# Patient Record
Sex: MALE | Race: WHITE
[De-identification: names, ages, dates, MRNs, and addresses within clinical notes are randomized per-mention and may not be internally consistent; named-entity substitution may affect disease eponyms.]

---

## 2020-05-21 ENCOUNTER — HOSPITAL ENCOUNTER (EMERGENCY)
Dept: HOSPITAL 8 - ED | Age: 20
Discharge: LEFT BEFORE DISCHARGE | End: 2020-05-21
Payer: SELF-PAY

## 2020-05-21 DIAGNOSIS — Z53.21: ICD-10-CM

## 2020-05-21 DIAGNOSIS — R68.89: Primary | ICD-10-CM

## 2020-05-22 ENCOUNTER — HOSPITAL ENCOUNTER (EMERGENCY)
Dept: HOSPITAL 8 - ED | Age: 20
Discharge: PSYCHIATRIC OR SUBSTANCE USE FACILITY | End: 2020-05-22
Payer: COMMERCIAL

## 2020-05-22 VITALS — WEIGHT: 220.46 LBS | HEIGHT: 74 IN | BODY MASS INDEX: 28.29 KG/M2

## 2020-05-22 VITALS — DIASTOLIC BLOOD PRESSURE: 80 MMHG | SYSTOLIC BLOOD PRESSURE: 124 MMHG

## 2020-05-22 DIAGNOSIS — F22: ICD-10-CM

## 2020-05-22 DIAGNOSIS — Z03.818: Primary | ICD-10-CM

## 2020-05-22 LAB
ALBUMIN SERPL-MCNC: 4.8 G/DL (ref 3.4–5)
ALP SERPL-CCNC: 77 U/L (ref 45–117)
ALT SERPL-CCNC: 26 U/L (ref 12–78)
ANION GAP SERPL CALC-SCNC: 6 MMOL/L (ref 5–15)
BASOPHILS # BLD AUTO: 0.07 X10^3/UL (ref 0–0.3)
BASOPHILS NFR BLD AUTO: 1 % (ref 0–1)
BILIRUB SERPL-MCNC: 2.4 MG/DL (ref 0.2–1)
CALCIUM SERPL-MCNC: 9 MG/DL (ref 8.5–10.1)
CHLORIDE SERPL-SCNC: 105 MMOL/L (ref 98–107)
CREAT SERPL-MCNC: 1.37 MG/DL (ref 0.7–1.3)
EOSINOPHIL # BLD AUTO: 0.05 X10^3/UL (ref 0–0.8)
EOSINOPHIL NFR BLD AUTO: 1 % (ref 1–7)
ERYTHROCYTE [DISTWIDTH] IN BLOOD BY AUTOMATED COUNT: 12.9 % (ref 9.4–14.8)
LYMPHOCYTES # BLD AUTO: 1.37 X10^3/UL (ref 1–6.1)
LYMPHOCYTES NFR BLD AUTO: 17 % (ref 22–44)
MCH RBC QN AUTO: 30.7 PG (ref 27.5–34.5)
MCHC RBC AUTO-ENTMCNC: 33.6 G/DL (ref 33.2–36.2)
MCV RBC AUTO: 91.3 FL (ref 81–97)
MD: NO
MONOCYTES # BLD AUTO: 0.78 X10^3/UL (ref 0–1.4)
MONOCYTES NFR BLD AUTO: 10 % (ref 2–9)
NEUTROPHILS # BLD AUTO: 5.63 X10^3/UL (ref 1.8–8)
NEUTROPHILS NFR BLD AUTO: 71 % (ref 42–75)
PLATELET # BLD AUTO: 216 X10^3/UL (ref 130–400)
PMV BLD AUTO: 8.9 FL (ref 7.4–10.4)
PROT SERPL-MCNC: 8.2 G/DL (ref 6.4–8.2)
RBC # BLD AUTO: 5.49 X10^6/UL (ref 4.38–5.82)
VANCOMYCIN TROUGH SERPL-MCNC: < 1.7 MG/DL (ref 2.8–20)

## 2020-05-22 PROCEDURE — 80307 DRUG TEST PRSMV CHEM ANLYZR: CPT

## 2020-05-22 PROCEDURE — 84443 ASSAY THYROID STIM HORMONE: CPT

## 2020-05-22 PROCEDURE — 80053 COMPREHEN METABOLIC PANEL: CPT

## 2020-05-22 PROCEDURE — 36415 COLL VENOUS BLD VENIPUNCTURE: CPT

## 2020-05-22 PROCEDURE — 99285 EMERGENCY DEPT VISIT HI MDM: CPT

## 2020-05-22 PROCEDURE — 85025 COMPLETE CBC W/AUTO DIFF WBC: CPT

## 2020-05-22 NOTE — NUR
pt is restless and manic. Pt repeatedly wanting to come out of room, talking to 
RNs and sitter with rushed speech and nonlinear thought process. Pt called mom, 
Krissy, using ER phone. Mother updated on pt care and status at this time. Pt 
is NAD, RESP WNL, FCS no SOB noted, MAEx4, call light on bed.

## 2020-05-22 NOTE — NUR
Dr Steiner psychiatrist accepting pt for [facility]. RN to call from [facility] for nurse 
to nurse report.

## 2020-05-22 NOTE — NUR
pt reoriented to reality, given sour strings candy, NAD, RESP WNL, rushed 
speech noted no SOB, P/W/D, WCTM. Waiting to transfer to [facility]

## 2020-05-22 NOTE — NUR
pt requiring reorientation approximately every 5 minutes, trying to leave room, 
rush speech that is nonlinear with a train of thought. Pt currently back in 
room, sitting up on bed, NAD, RESP WNL, P/W/D, FCS no SOB. WCTM. waiting for 
placement bed.

## 2020-05-22 NOTE — NUR
Bedside report from William TANG, pt care transferred at this time. Pt resting in 
gurney, under blankets, NAD, RESP even and unlabored, rushed/pressured speech 
noted, pt conversations are not linear and jumps around, P/W/D, 1/1 bags 
checked in [facility].

## 2020-05-22 NOTE — NUR
URINE COLLECTED AND WALKED TO LAB.  PATIENT PUT ON HOSPITAL BED FOR COMFORT.  
PATIENT KEEPS MAKING STATEMENTS THAT HE WANTS TO GO HOME, BUT CONTINUES TO HAVE 
FLIGHT OF IDEAS.  EXPLAINED TO PATIENT THAT HE IS ON A LEGAL HOLD AND THAT HE 
IS UNABLE TO LEAVE AT THIS TIME.

## 2020-05-22 NOTE — NUR
TASK RN: ERP AT BEDSIDE FOR INITIAL ASSESSMENT. PT PACING IN ROOM, PRESSURED 
SPEECH, AND REFUSING PHYSICAL EXAM. ROOM SECURE, NO BELONGINGS NOTED IN ROOM. 
SITTER AT DOORWAY FOR CLOSE OBS. NAD NOTED.

## 2020-05-22 NOTE — NUR
pt updated on POC and transfer to [facility], NAD, RESP WNL, P/W/D. WCTM.



Report to Trent TANG at [facility]. Pt to transfer at approximately [id number]

## 2020-05-22 NOTE — NUR
pt resting in [facility] at this time, NAD, RESP WNL, lights dimmed for comfort, 
P/W/D. WCTM. waiting for [facility] bed

## 2021-09-28 ENCOUNTER — HOSPITAL ENCOUNTER (EMERGENCY)
Dept: HOSPITAL 8 - ED | Age: 21
Discharge: HOME | End: 2021-09-28
Payer: COMMERCIAL

## 2021-09-28 VITALS — HEIGHT: 75 IN | BODY MASS INDEX: 39.17 KG/M2 | WEIGHT: 315 LBS

## 2021-09-28 VITALS — DIASTOLIC BLOOD PRESSURE: 94 MMHG | SYSTOLIC BLOOD PRESSURE: 142 MMHG

## 2021-09-28 DIAGNOSIS — F17.200: ICD-10-CM

## 2021-09-28 DIAGNOSIS — F51.04: Primary | ICD-10-CM

## 2021-09-28 DIAGNOSIS — R05: ICD-10-CM

## 2021-09-28 DIAGNOSIS — F31.9: ICD-10-CM

## 2021-09-28 LAB
ALBUMIN SERPL-MCNC: 3.7 G/DL (ref 3.4–5)
ALP SERPL-CCNC: 93 U/L (ref 45–117)
ALT SERPL-CCNC: 31 U/L (ref 12–78)
ANION GAP SERPL CALC-SCNC: 3 MMOL/L (ref 5–15)
BASOPHILS # BLD AUTO: 0.1 X10^3/UL (ref 0–0.1)
BASOPHILS NFR BLD AUTO: 1 % (ref 0–1)
BILIRUB SERPL-MCNC: 1 MG/DL (ref 0.2–1)
CALCIUM SERPL-MCNC: 8.6 MG/DL (ref 8.5–10.1)
CHLORIDE SERPL-SCNC: 108 MMOL/L (ref 98–107)
CREAT SERPL-MCNC: 0.98 MG/DL (ref 0.7–1.3)
EOSINOPHIL # BLD AUTO: 0.1 X10^3/UL (ref 0–0.4)
EOSINOPHIL NFR BLD AUTO: 1 % (ref 1–7)
ERYTHROCYTE [DISTWIDTH] IN BLOOD BY AUTOMATED COUNT: 13.4 % (ref 9.4–14.8)
LYMPHOCYTES # BLD AUTO: 1.5 X10^3/UL (ref 1–3.4)
LYMPHOCYTES NFR BLD AUTO: 15 % (ref 22–44)
MCH RBC QN AUTO: 30 PG (ref 27.5–34.5)
MCHC RBC AUTO-ENTMCNC: 34.5 G/DL (ref 33.2–36.2)
MONOCYTES # BLD AUTO: 0.9 X10^3/UL (ref 0.2–0.8)
MONOCYTES NFR BLD AUTO: 8 % (ref 2–9)
NEUTROPHILS # BLD AUTO: 7.6 X10^3/UL (ref 1.8–6.8)
NEUTROPHILS NFR BLD AUTO: 75 % (ref 42–75)
PLATELET # BLD AUTO: 232 X10^3/UL (ref 130–400)
PMV BLD AUTO: 9.4 FL (ref 7.4–10.4)
PROT SERPL-MCNC: 7.2 G/DL (ref 6.4–8.2)
RBC # BLD AUTO: 5.57 X10^6/UL (ref 4.38–5.82)
T4 (THYROXINE): 6.2 MCG/DL (ref 4.5–12.1)

## 2021-09-28 PROCEDURE — 80053 COMPREHEN METABOLIC PANEL: CPT

## 2021-09-28 PROCEDURE — 71045 X-RAY EXAM CHEST 1 VIEW: CPT

## 2021-09-28 PROCEDURE — 85025 COMPLETE CBC W/AUTO DIFF WBC: CPT

## 2021-09-28 PROCEDURE — 84443 ASSAY THYROID STIM HORMONE: CPT

## 2021-09-28 PROCEDURE — 36415 COLL VENOUS BLD VENIPUNCTURE: CPT

## 2021-09-28 PROCEDURE — 84436 ASSAY OF TOTAL THYROXINE: CPT

## 2021-09-28 PROCEDURE — 80178 ASSAY OF LITHIUM: CPT

## 2021-09-28 PROCEDURE — 99284 EMERGENCY DEPT VISIT MOD MDM: CPT

## 2022-11-22 ENCOUNTER — APPOINTMENT (OUTPATIENT)
Dept: RADIOLOGY | Facility: IMAGING CENTER | Age: 22
End: 2022-11-22
Attending: NURSE PRACTITIONER

## 2022-11-22 ENCOUNTER — OFFICE VISIT (OUTPATIENT)
Dept: URGENT CARE | Facility: CLINIC | Age: 22
End: 2022-11-22

## 2022-11-22 VITALS
SYSTOLIC BLOOD PRESSURE: 128 MMHG | RESPIRATION RATE: 16 BRPM | HEART RATE: 68 BPM | DIASTOLIC BLOOD PRESSURE: 78 MMHG | OXYGEN SATURATION: 98 % | TEMPERATURE: 98 F

## 2022-11-22 DIAGNOSIS — S30.0XXA CONTUSION OF COCCYX, INITIAL ENCOUNTER: ICD-10-CM

## 2022-11-22 PROCEDURE — 99203 OFFICE O/P NEW LOW 30 MIN: CPT | Performed by: NURSE PRACTITIONER

## 2022-11-23 ASSESSMENT — ENCOUNTER SYMPTOMS
SWOLLEN GLANDS: 0
FEVER: 0
TINGLING: 0
NECK PAIN: 0
ABDOMINAL PAIN: 0
BACK PAIN: 0
CHILLS: 0
MYALGIAS: 0
SORE THROAT: 0
SHORTNESS OF BREATH: 0
PAIN: 1
NAUSEA: 0
DIZZINESS: 0
VOMITING: 0
WEAKNESS: 0
EYE REDNESS: 0
VERTIGO: 0

## 2022-11-23 NOTE — PROGRESS NOTES
Subjective:   Sergio Dave is a 22 y.o. male who presents for Pain (Tailbone contusion crashed snowboarding)      Pain  This is a new problem. The current episode started in the past 7 days (Crashed while snowboarding landing directly on tailbone having pain). Pertinent negatives include no abdominal pain, chest pain, chills, fever, myalgias, nausea, neck pain, rash, sore throat, swollen glands, vertigo, vomiting or weakness. The symptoms are aggravated by walking (Sitting). He has tried acetaminophen and NSAIDs for the symptoms. The treatment provided mild relief.     Review of Systems   Constitutional:  Negative for chills and fever.   HENT:  Negative for sore throat.    Eyes:  Negative for redness.   Respiratory:  Negative for shortness of breath.    Cardiovascular:  Negative for chest pain.   Gastrointestinal:  Negative for abdominal pain, nausea and vomiting.   Genitourinary:  Negative for dysuria.   Musculoskeletal:  Negative for back pain, myalgias and neck pain.        Tailbone pain   Skin:  Negative for rash.   Neurological:  Negative for dizziness, vertigo, tingling and weakness.     Medications:    This patient does not have an active medication from one of the medication groupers.    Allergies: Patient has no known allergies.    Problem List: Sergio Dave does not have a problem list on file.    Surgical History:  No past surgical history on file.    Past Social Hx: Sergio Dave       Past Family Hx:  Sergio Dave family history is not on file.     Problem list, medications, and allergies reviewed by myself today in Epic.     Objective:     /78   Pulse 68   Temp 36.7 °C (98 °F)   Resp 16   SpO2 98%     Physical Exam  Constitutional:       Appearance: Normal appearance. He is not ill-appearing or toxic-appearing.   HENT:      Head: Normocephalic.      Right Ear: External ear normal.      Left Ear: External ear normal.      Nose: Nose normal.      Mouth/Throat:      Lips: Pink.      Mouth: Mucous  membranes are moist.   Eyes:      General: Lids are normal.         Right eye: No discharge.         Left eye: No discharge.   Pulmonary:      Effort: Pulmonary effort is normal. No accessory muscle usage or respiratory distress.   Musculoskeletal:         General: Normal range of motion.      Cervical back: Normal and full passive range of motion without pain.      Thoracic back: Normal.      Lumbar back: Spasms and tenderness present.        Back:       Comments: Tenderness palpation to the coccyx region, gait normal.   Skin:     Coloration: Skin is not pale.   Neurological:      Mental Status: He is alert and oriented to person, place, and time.      Deep Tendon Reflexes:      Reflex Scores:       Patellar reflexes are 2+ on the right side and 2+ on the left side.  Psychiatric:         Mood and Affect: Mood normal.         Thought Content: Thought content normal.       Assessment/Plan:     Diagnosis and associated orders:     1. Contusion of coccyx, initial encounter  CANCELED: DX-SACRUM AND COCCYX 2+         Comments/MDM:       I personally reviewed prior external notes and prior test results pertinent to today's visit.   Patient having no neurological deficits,, no signs of cauda equina, patient is without insurance declined x-ray imaging on today's exam.  Discussed rest, Tylenol Motrin as needed for pain, did range of motion, resume activity as tolerated.  Discussed management options, risks and benefits, and alternatives to treatment plan agreed upon.   Red flags discussed and indications to immediately call 911 or present to the Emergency Department.   Supportive care, differential diagnoses, and indications for immediate follow-up discussed with patient.    Patient expresses understanding and agrees to plan. Patient denies any other questions or concerns.            Please note that this dictation was created using voice recognition software. I have made a reasonable attempt to correct obvious errors, but I  expect that there are errors of grammar and possibly content that I did not discover before finalizing the note.    This note was electronically signed by Ed BROWNING.

## 2024-09-23 SDOH — ECONOMIC STABILITY: INCOME INSECURITY: IN THE LAST 12 MONTHS, WAS THERE A TIME WHEN YOU WERE NOT ABLE TO PAY THE MORTGAGE OR RENT ON TIME?: YES

## 2024-09-23 SDOH — ECONOMIC STABILITY: FOOD INSECURITY: WITHIN THE PAST 12 MONTHS, YOU WORRIED THAT YOUR FOOD WOULD RUN OUT BEFORE YOU GOT MONEY TO BUY MORE.: NEVER TRUE

## 2024-09-23 SDOH — ECONOMIC STABILITY: TRANSPORTATION INSECURITY
IN THE PAST 12 MONTHS, HAS LACK OF RELIABLE TRANSPORTATION KEPT YOU FROM MEDICAL APPOINTMENTS, MEETINGS, WORK OR FROM GETTING THINGS NEEDED FOR DAILY LIVING?: NO

## 2024-09-23 SDOH — ECONOMIC STABILITY: INCOME INSECURITY: HOW HARD IS IT FOR YOU TO PAY FOR THE VERY BASICS LIKE FOOD, HOUSING, MEDICAL CARE, AND HEATING?: HARD

## 2024-09-23 SDOH — ECONOMIC STABILITY: FOOD INSECURITY: WITHIN THE PAST 12 MONTHS, THE FOOD YOU BOUGHT JUST DIDN'T LAST AND YOU DIDN'T HAVE MONEY TO GET MORE.: NEVER TRUE

## 2024-09-23 SDOH — ECONOMIC STABILITY: TRANSPORTATION INSECURITY
IN THE PAST 12 MONTHS, HAS THE LACK OF TRANSPORTATION KEPT YOU FROM MEDICAL APPOINTMENTS OR FROM GETTING MEDICATIONS?: NO

## 2024-09-23 SDOH — HEALTH STABILITY: PHYSICAL HEALTH: ON AVERAGE, HOW MANY MINUTES DO YOU ENGAGE IN EXERCISE AT THIS LEVEL?: 60 MIN

## 2024-09-23 SDOH — ECONOMIC STABILITY: TRANSPORTATION INSECURITY
IN THE PAST 12 MONTHS, HAS LACK OF TRANSPORTATION KEPT YOU FROM MEETINGS, WORK, OR FROM GETTING THINGS NEEDED FOR DAILY LIVING?: NO

## 2024-09-23 SDOH — ECONOMIC STABILITY: HOUSING INSECURITY
IN THE LAST 12 MONTHS, WAS THERE A TIME WHEN YOU DID NOT HAVE A STEADY PLACE TO SLEEP OR SLEPT IN A SHELTER (INCLUDING NOW)?: YES

## 2024-09-23 SDOH — HEALTH STABILITY: MENTAL HEALTH
STRESS IS WHEN SOMEONE FEELS TENSE, NERVOUS, ANXIOUS, OR CAN'T SLEEP AT NIGHT BECAUSE THEIR MIND IS TROUBLED. HOW STRESSED ARE YOU?: RATHER MUCH

## 2024-09-23 SDOH — HEALTH STABILITY: PHYSICAL HEALTH: ON AVERAGE, HOW MANY DAYS PER WEEK DO YOU ENGAGE IN MODERATE TO STRENUOUS EXERCISE (LIKE A BRISK WALK)?: 4 DAYS

## 2024-09-23 ASSESSMENT — SOCIAL DETERMINANTS OF HEALTH (SDOH)
HOW OFTEN DO YOU HAVE A DRINK CONTAINING ALCOHOL: 2-3 TIMES A WEEK
HOW OFTEN DO YOU GET TOGETHER WITH FRIENDS OR RELATIVES?: TWICE A WEEK
HOW OFTEN DO YOU ATTEND CHURCH OR RELIGIOUS SERVICES?: NEVER
IN THE PAST 12 MONTHS, HAS THE ELECTRIC, GAS, OIL, OR WATER COMPANY THREATENED TO SHUT OFF SERVICE IN YOUR HOME?: NO
HOW OFTEN DO YOU ATTENT MEETINGS OF THE CLUB OR ORGANIZATION YOU BELONG TO?: NEVER
HOW OFTEN DO YOU HAVE SIX OR MORE DRINKS ON ONE OCCASION: LESS THAN MONTHLY
HOW OFTEN DO YOU GET TOGETHER WITH FRIENDS OR RELATIVES?: TWICE A WEEK
DO YOU BELONG TO ANY CLUBS OR ORGANIZATIONS SUCH AS CHURCH GROUPS UNIONS, FRATERNAL OR ATHLETIC GROUPS, OR SCHOOL GROUPS?: NO
DO YOU BELONG TO ANY CLUBS OR ORGANIZATIONS SUCH AS CHURCH GROUPS UNIONS, FRATERNAL OR ATHLETIC GROUPS, OR SCHOOL GROUPS?: NO
IN A TYPICAL WEEK, HOW MANY TIMES DO YOU TALK ON THE PHONE WITH FAMILY, FRIENDS, OR NEIGHBORS?: THREE TIMES A WEEK
ARE YOU MARRIED, WIDOWED, DIVORCED, SEPARATED, NEVER MARRIED, OR LIVING WITH A PARTNER?: NEVER MARRIED
HOW OFTEN DO YOU ATTENT MEETINGS OF THE CLUB OR ORGANIZATION YOU BELONG TO?: NEVER
HOW MANY DRINKS CONTAINING ALCOHOL DO YOU HAVE ON A TYPICAL DAY WHEN YOU ARE DRINKING: 3 OR 4
HOW HARD IS IT FOR YOU TO PAY FOR THE VERY BASICS LIKE FOOD, HOUSING, MEDICAL CARE, AND HEATING?: HARD
WITHIN THE PAST 12 MONTHS, YOU WORRIED THAT YOUR FOOD WOULD RUN OUT BEFORE YOU GOT THE MONEY TO BUY MORE: NEVER TRUE
HOW OFTEN DO YOU ATTEND CHURCH OR RELIGIOUS SERVICES?: NEVER
IN A TYPICAL WEEK, HOW MANY TIMES DO YOU TALK ON THE PHONE WITH FAMILY, FRIENDS, OR NEIGHBORS?: THREE TIMES A WEEK
ARE YOU MARRIED, WIDOWED, DIVORCED, SEPARATED, NEVER MARRIED, OR LIVING WITH A PARTNER?: NEVER MARRIED

## 2024-09-23 ASSESSMENT — LIFESTYLE VARIABLES
AUDIT-C TOTAL SCORE: 5
HOW MANY STANDARD DRINKS CONTAINING ALCOHOL DO YOU HAVE ON A TYPICAL DAY: 3 OR 4
SKIP TO QUESTIONS 9-10: 0
HOW OFTEN DO YOU HAVE SIX OR MORE DRINKS ON ONE OCCASION: LESS THAN MONTHLY
HOW OFTEN DO YOU HAVE A DRINK CONTAINING ALCOHOL: 2-3 TIMES A WEEK

## 2024-09-26 ENCOUNTER — OFFICE VISIT (OUTPATIENT)
Dept: MEDICAL GROUP | Facility: CLINIC | Age: 24
End: 2024-09-26
Payer: COMMERCIAL

## 2024-09-26 VITALS
TEMPERATURE: 97.5 F | OXYGEN SATURATION: 97 % | SYSTOLIC BLOOD PRESSURE: 120 MMHG | DIASTOLIC BLOOD PRESSURE: 80 MMHG | WEIGHT: 289.6 LBS | BODY MASS INDEX: 37.17 KG/M2 | HEIGHT: 74 IN | RESPIRATION RATE: 16 BRPM | HEART RATE: 57 BPM

## 2024-09-26 DIAGNOSIS — S49.92XA INJURY OF LEFT SHOULDER, INITIAL ENCOUNTER: ICD-10-CM

## 2024-09-26 DIAGNOSIS — Z86.69 HISTORY OF HEARING LOSS: ICD-10-CM

## 2024-09-26 DIAGNOSIS — Z86.59 HISTORY OF BIPOLAR DISORDER: ICD-10-CM

## 2024-09-26 DIAGNOSIS — Z76.89 ENCOUNTER TO ESTABLISH CARE: ICD-10-CM

## 2024-09-26 RX ORDER — LAMOTRIGINE 25 MG/1
25 TABLET ORAL 2 TIMES DAILY
COMMUNITY
Start: 2024-02-19

## 2024-09-26 RX ORDER — TADALAFIL 5 MG/1
5 TABLET ORAL PRN
COMMUNITY
Start: 2023-08-01

## 2024-09-26 ASSESSMENT — PATIENT HEALTH QUESTIONNAIRE - PHQ9: CLINICAL INTERPRETATION OF PHQ2 SCORE: 0

## 2024-09-26 NOTE — PROGRESS NOTES
SUBJECTIVE:     CC:    Chief Complaint   Patient presents with    New Patient     Establish care, ear pressure for 8mos now, mental health care, left shoulder injury       HISTORY OF THE PRESENT ILLNESS:   Patient is a 24 y.o. male. This pleasant patient is here today to establish care and requesting referrals to behavioral health and ENT. Patient is originally from [place], relocated to [place] in 2017. But continue to have his healthcare through [facility]. This is his first opportunity to have his own personal pcp. Patient states he is up to date on his vaccines. Patient states he has no major concerns today.     Past Medical History:  Past Medical History:   Diagnosis Date    Anxiety     Depression      Surgical History:  No past surgical history on file.    Family History:  Family History   Problem Relation Age of Onset    Diabetes Father         recently developed either pre or full type 2    Stroke Father         epilepsy    Breast Cancer Maternal Grandmother         mastectomy needed    Dementia Paternal Grandfather         dementia and Alzheimer's    Psychiatric Illness Paternal Uncle         Bipolar 1       Social History:  Social History     Tobacco Use    Smoking status: Former     Current packs/day: 0.10     Average packs/day: 0.1 packs/day for 1 year (0.1 ttl pk-yrs)     Types: Cigarettes, Cigars    Tobacco comments:     daily user of nicotine vaporizer; desire to quit, found extremely difficult   Substance Use Topics    Alcohol use: Yes     Alcohol/week: 4.8 oz     Types: 6 Cans of beer, 2 Shots of liquor per week     Comment: variable based on the week; values represent LT average    Drug use: Not Currently     Types: Marijuana     Comment: previously utilized marijuana both smoking and edibles       Medications:  Current Outpatient Medications on File Prior to Visit   Medication Sig Dispense Refill    lamoTRIgine (LAMICTAL) 25 MG Tab Take 25 mg by mouth 2 times a day.      tadalafil (CIALIS) 5 MG  "tablet Take 5 mg by mouth as needed.       No current facility-administered medications on file prior to visit.       No Known Allergies      ROS:   Gen: no fevers/chills, no changes in weight  Eyes: no changes in vision  ENT: no changes in hearing  Pulm: no sob, no cough  CV: no chest pain, no palpitations  GI: no nausea/vomiting, no diarrhea  MSk: no myalgias  Skin: no rash  Neuro: no headaches, no numbness/tingling      OBJECTIVE:     Exam: /80 (BP Location: Left arm, Patient Position: Sitting, BP Cuff Size: Large adult)   Pulse (!) 57   Temp 36.4 °C (97.5 °F) (Temporal)   Resp 16   Ht 1.88 m (6' 2\")   Wt (!) 131 kg (289 lb 9.6 oz)   SpO2 97%  Body mass index is 37.18 kg/m².    General: Normal appearing. No distress.  HEENT: Normocephalic. Atraumatic.  Neck: Supple without JVD or bruit. Thyroid is not enlarged.  Pulmonary: Clear to ausculation.  Normal effort. No rales, ronchi, or wheezing.  Cardiovascular: Regular rate and rhythm without murmur. Carotid and radial pulses are intact and equal bilaterally.  Abdomen: Soft, nontender, nondistended. Normal bowel sounds.   Neurologic: Grossly nonfocal  Musculoskeletal: Normal gait. No extremity cyanosis, clubbing, or edema.  Psych: Normal mood and affect. Alert and oriented x3. Judgment and insight is normal.      ASSESSMENT & PLAN:   24 y.o. male with the following -      Problem List Items Addressed This Visit       Encounter to establish care      - Lifestyle and dietary modifications were emphasized including exercising and walking.   - Adequate hydration with water  - Eating healthy meals, fruits and vegetable,   - Decreasing food with high sugar content such as soda intake and sweets.  - Limiting fatty and greasy foods. Avoid foods high in fat.  - Recommend lean cuts of meats, skim milk or 1%.  - Encourage to bake, broil, boil, grill, roast or microwave foods.        History of hearing loss      Patient reports of history of ear issues in the last " 2.5 years but worsening hearing volume loss and pressure (R>L) right) in the past 8 months. He denies vertigo or dizzy symptoms. Patient is requesting referral to ENT.  - Patient will sign release of medical information for in order to get his medical records from [facility].  - Referral to ENT placed.      Relevant Orders    Referral to ENT        History of bipolar disorder      Patient reports long history of mental health. He states was on lithium and clozapine from [facility]. However, in May 2020, he was diagnosed with Bipolar 1 by [facility], where he was inpatient hold for 3 months (May 2020 - July 2020). He denies manic episodes since. He states he was on lamotrigine 25mg BID. Last use was Ivonne 10, 2024. Patient admits he is not compliant with medications.  - Patient will sign release of medical information for in order to get his medical records from [facility].  - Referral to behavioral health placed.      Relevant Orders    Referral to Behavioral Health      Injury of left shoulder, initial encounter      Patient states he has a history of left shoulder pain after a snowboard accident in 2022. He states imagings and physical therapy were ordered, but he did not follow up with PT. Patient denies any symptoms at this time.  - Patient will sign release of medical information for in order to get his medical records from [facility].  - Encourage patient to follow up with [facility] sport medicine team as needed.         Freddie Bailey, PGY-3  [facility]

## 2024-10-03 ENCOUNTER — APPOINTMENT (OUTPATIENT)
Dept: RADIOLOGY | Facility: CLINIC | Age: 24
End: 2024-10-03
Attending: STUDENT IN AN ORGANIZED HEALTH CARE EDUCATION/TRAINING PROGRAM
Payer: COMMERCIAL

## 2024-10-03 ENCOUNTER — APPOINTMENT (OUTPATIENT)
Dept: MEDICAL GROUP | Facility: CLINIC | Age: 24
End: 2024-10-03
Payer: COMMERCIAL

## 2024-10-03 VITALS
WEIGHT: 289 LBS | BODY MASS INDEX: 37.09 KG/M2 | SYSTOLIC BLOOD PRESSURE: 122 MMHG | RESPIRATION RATE: 16 BRPM | DIASTOLIC BLOOD PRESSURE: 81 MMHG | HEART RATE: 72 BPM | OXYGEN SATURATION: 98 % | HEIGHT: 74 IN

## 2024-10-03 DIAGNOSIS — M25.512 CHRONIC LEFT SHOULDER PAIN: ICD-10-CM

## 2024-10-03 DIAGNOSIS — G89.29 CHRONIC LEFT SHOULDER PAIN: ICD-10-CM

## 2024-10-03 PROCEDURE — 99213 OFFICE O/P EST LOW 20 MIN: CPT | Mod: GC | Performed by: STUDENT IN AN ORGANIZED HEALTH CARE EDUCATION/TRAINING PROGRAM

## 2024-10-03 PROCEDURE — 3079F DIAST BP 80-89 MM HG: CPT | Performed by: STUDENT IN AN ORGANIZED HEALTH CARE EDUCATION/TRAINING PROGRAM

## 2024-10-03 PROCEDURE — 73030 X-RAY EXAM OF SHOULDER: CPT | Mod: TC,LT | Performed by: RADIOLOGY

## 2024-10-03 PROCEDURE — 3074F SYST BP LT 130 MM HG: CPT | Performed by: STUDENT IN AN ORGANIZED HEALTH CARE EDUCATION/TRAINING PROGRAM

## 2024-11-27 ENCOUNTER — OFFICE VISIT (OUTPATIENT)
Dept: BEHAVIORAL HEALTH | Facility: CLINIC | Age: 24
End: 2024-11-27
Payer: COMMERCIAL

## 2024-11-27 DIAGNOSIS — F31.9 BIPOLAR I DISORDER (HCC): ICD-10-CM

## 2024-11-27 PROCEDURE — 90791 PSYCH DIAGNOSTIC EVALUATION: CPT | Performed by: MARRIAGE & FAMILY THERAPIST

## 2024-11-27 NOTE — PROGRESS NOTES
[facility]   Initial Assessment    Name: Sergio Dave  MRN: [id number]  : 2000  Age: 24 y.o.  Date of assessment: 2024  PCP: Carlitos Bailey M.D.  Persons in attendance: Patient    CHIEF COMPLAINT AND HISTORY OF PRESENTING PROBLEM:  Sergio Dave is a 24 y.o., White male referred for assessment by Carlitos Bailey,*.    Pt reports getting an actual referral to [facility] and the scheduling of the [facility] appts has taken longer than he had hoped, and it's further complicated in that [facility] [in [place]] has been very slow to release Tx information to [facility].      Pt wants to get established with a psychiatrist. He has a psychiatric intake Dec 3, 2024, with SALINAS Reddy MD. Pt not taking meds presently, he was taking lamotrigine past , 'I've been noncompliant with it.' Pt very concerned about possible medications as 'I gained a crap-ton of weight' before, getting up to 360 pounds. Has also taken lithium and clozapine approx 4 years ago.     Pt further explained that he wants to meet with a talk therapist, as he did not receive this thru his treatment at [facility] for at least the past x4y.    BEHAVIORAL HEALTH TREATMENT HISTORY  Does patient/parent report a history of prior behavioral health treatment for patient? Was seeing psychiatrist and LCSW at [facility], he was not particularly satisfied in the care he received as it was online, infrequent, and impersonal, and there was no meaningful talk therapy component.    FAMILY/SOCIAL HISTORY  Current living situation/household members: Presently lives alone, in apartment. Has been in [place] 2017-present. Pt says he has an active friend group and enjoys winter sports    Does patient/parent report a family history of behavioral health issues, diagnoses, or treatment?   Family History   Problem Relation Age of Onset    Diabetes Father         recently developed either pre or full type 2    Stroke Father         epilepsy    Breast Cancer Maternal Grandmother          mastectomy needed    Dementia Paternal Grandfather         dementia and Alzheimer's    Psychiatric Illness Paternal Uncle         Bipolar 1        EMPLOYMENT/RESOURCES  Is the patient currently employed? Yes  Does the patient/parent report adequate financial resources? Attended [employer], studied information systems, earned his BS in Information Systems, May 2022.  Presently working in the field 'somewhat, not challenged or satisfied in the work he's doing.' He's actively looking elsewhere for work, particularly in [facility], and [place].    ABUSE/NEGLECT/TRAUMA SCREENING  Does patient report feeling “unsafe” in his/her home, or afraid of anyone? No  Does patient report any history of physical, sexual, or emotional abuse? No  Is there evidence of neglect by self? No                                                                                                        SAFETY ASSESSMENT - SELF  Does patient acknowledge current or past symptoms of dangerousness to self? Pt states 'No'  Recent change in frequency/specificity/intensity of suicidal thoughts or self-harm behavior? No  Current access to firearms, medications, or other identified means of suicide/self-harm? No  Current Suicide Risk: Low  Crisis Safety Plan completed and copy given to patient: no    SAFETY ASSESSMENT - OTHERS  Recent change in frequency/specificity/intensity of thoughts or threats to harm others? No  Current Homicide Risk:  Low  Crisis Safety Plan completed and copy given to patient? no    SUBSTANCE USE/ADDICTION HISTORY  Yes only responsible adult alcohol use presently, possibly every other week, up to x5 drinks. 'In the past, it might have been a concern, but I've slowed down a lot' he says    MENTAL STATUS/OBSERVATIONS              Participation: Active verbal participation, yet guarded  Grooming: Casual  Orientation:Alert   Behavior: Calm  Eye contact: Good          Mood:Euthymic  Affect:Flexible  Thought process: Logical  Speech: Rate within  normal limits  Memory: No gross evidence of memory deficits  Insight: Good  Judgment:  Good    Patient's motivation/readiness for change: After a long wait and overcoming some logistic hurdles, Pt states he's ready to meet with providers, hopefully clarify a Dx,and take care of himself    Care plan completed: Yes, preliminarily, Pt stating that 'right now, I have a minimal, manageable level of depression.' He doesn't feel good about his level of fitness, his present financial station in life, a series of rejections in the dating world, 'I feel I'm missing out--I'm young, I have an inconsistent sex life, and lacking a fulfilling relationship, I'm doing some things to better myself, but I feel there's more I could do.'    Does patient express agreement with the plan? Yes     Diagnosis: F31.9 Bipolar I disorder, by Pt report    MARK Martinez

## 2024-12-03 ENCOUNTER — OFFICE VISIT (OUTPATIENT)
Dept: BEHAVIORAL HEALTH | Facility: CLINIC | Age: 24
End: 2024-12-03
Payer: COMMERCIAL

## 2024-12-03 DIAGNOSIS — F31.9 BIPOLAR 1 DISORDER (HCC): ICD-10-CM

## 2024-12-03 DIAGNOSIS — F41.1 GAD (GENERALIZED ANXIETY DISORDER): ICD-10-CM

## 2024-12-03 PROCEDURE — 96127 BRIEF EMOTIONAL/BEHAV ASSMT: CPT | Performed by: PSYCHIATRY & NEUROLOGY

## 2024-12-03 PROCEDURE — 99204 OFFICE O/P NEW MOD 45 MIN: CPT | Performed by: PSYCHIATRY & NEUROLOGY

## 2024-12-03 RX ORDER — LAMOTRIGINE 25 MG/1
TABLET ORAL
Qty: 75 TABLET | Refills: 0 | Status: SHIPPED | OUTPATIENT
Start: 2024-12-03 | End: 2024-12-04 | Stop reason: SDUPTHER

## 2024-12-03 ASSESSMENT — ANXIETY QUESTIONNAIRES
1. FEELING NERVOUS, ANXIOUS, OR ON EDGE: MORE THAN HALF THE DAYS
4. TROUBLE RELAXING: MORE THAN HALF THE DAYS
GAD7 TOTAL SCORE: 12
7. FEELING AFRAID AS IF SOMETHING AWFUL MIGHT HAPPEN: NOT AT ALL
IF YOU CHECKED OFF ANY PROBLEMS ON THIS QUESTIONNAIRE, HOW DIFFICULT HAVE THESE PROBLEMS MADE IT FOR YOU TO DO YOUR WORK, TAKE CARE OF THINGS AT HOME, OR GET ALONG WITH OTHER PEOPLE: SOMEWHAT DIFFICULT
2. NOT BEING ABLE TO STOP OR CONTROL WORRYING: MORE THAN HALF THE DAYS
6. BECOMING EASILY ANNOYED OR IRRITABLE: NEARLY EVERY DAY
3. WORRYING TOO MUCH ABOUT DIFFERENT THINGS: MORE THAN HALF THE DAYS
5. BEING SO RESTLESS THAT IT IS HARD TO SIT STILL: SEVERAL DAYS

## 2024-12-03 ASSESSMENT — PATIENT HEALTH QUESTIONNAIRE - PHQ9
CLINICAL INTERPRETATION OF PHQ2 SCORE: 3
5. POOR APPETITE OR OVEREATING: 1 - SEVERAL DAYS
SUM OF ALL RESPONSES TO PHQ QUESTIONS 1-9: 11

## 2024-12-03 NOTE — PROGRESS NOTES
"  INITIAL PSYCHIATRIC EVALUATION      This provider informed the patient their medical records are totally confidential except for the use by other providers involved in their care, or if the patient signs a release, or to report instances of child or elder abuse, or if it is determined they are an immediate risk to harm themselves or others.      CHIEF COMPLAINT  \"Need help with mood\"      HISTORY OF PRESENT ILLNESS  Sergio Dave is a 24 y.o. old male comes in today to establish care and for evaluation of mood and anxiety.  I did reviewed all outpatient psychiatry follow up notes over last 3 years. Patient is new to the clinic.     History of Present Illness  The patient is a 24-year-old male who presents for evaluation of bipolar disorder.    He was diagnosed with bipolar type 1 following a manic episode in 2020, which led to a 2-month involuntary hospitalization at [facility]. His symptoms included heightened excitement, reduced need for sleep, increased energy, hyperactivity, and overconfidence. He also experienced hallucinations and delusions at that time. He has a family history of mental health disorders, including an uncle with bipolar disorder.  During this last manic episode, he was using nicotine, alcohol, cocaine, acid, and marijuana. He has since stopped using these substances, except for occasional vaping and alcohol consumption. He reports that marijuana use made him uncomfortable around people.  He currently denies manic symptoms since this last admission in 2020 and reporting depression and anxiety symptoms.  He is currently experiencing depressive symptoms, which he attributes to his past hospitalization and its aftermath. He had a severe depressive episode in February 2024, for which he received outpatient care and therapy.   He was initially treated with lithium and clozapine but was noncompliant with the medication regimen. He was later prescribed lamotrigine but was noncompliant due to healthcare " issues.  He reports no current symptoms of psychosis or schizophrenia.   He has gained weight due to his medication, which has affected his mood and self-confidence.   He is currently working on losing weight through diet and exercise.   His main stressors are financial issues and low self-confidence related to his weight. He took a year off from school due to his condition.  He began psychotherapy with Santiago in our clinic.    Discussed the differential diagnosis of bipolar disorder versus MDD combined with JEANNA but emphasis given on long hospitalization in 2023 with typical manic symptoms likely under the influence of drugs with positive family history pointing more in line of bipolar disorder.    Agreed with plan of initiating lamotrigine first and reaching the therapeutic level and then assessing if a low-dose antidepressant or Latuda is indicated.        PSYCHIATRIC REVIEW OF SYSTEMS: see HPI for depressive symptoms, see HPI for anxeity symptoms, and see HPI for manic symptoms      MEDICAL REVIEW OF SYSTEMS:   Constitutional negative   Eyes negative   Ears/Nose/Mouth/Throat negative   Cardiovascular negative   Respiratory negative   Gastrointestinal negative   Genitourinary negative   Muscular negative   Integumentary negative   Neurological negative   Endocrine negative   Hematologic/Lymphatic negative     CURRENT MEDICATIONS:  Current Outpatient Medications   Medication Sig Dispense Refill    lamoTRIgine (LAMICTAL) 25 MG Tab Take 25 mg by mouth 2 times a day.      tadalafil (CIALIS) 5 MG tablet Take 5 mg by mouth as needed.       No current facility-administered medications for this visit.       ALLERGIES:  Patient has no known allergies.      PAST PSYCHIATRIC MEDICATIONS  Lithium, Depakote, Lamictal  Haldol, Thorazine, Risperidone, Seroquel, Clozapine  Klonopin       FAMILY HISTORY  Paternal uncle with bipolar diorder      MEDICAL HISTORY  Past Medical History:   Diagnosis Date    Anxiety     Depression       No past surgical history on file.      PHYSICAL EXAMINAION:  Vital signs: There were no vitals taken for this visit.  Musculoskeletal: Normal gait.   Abnormal movements: none    MENTAL STATUS EXAMINATION      General:   - Grooming and hygiene: Casual,   - Apparent distress: none,   - Behavior: Calm  - Eye Contact:  Good,   - no psychomotor agitation or retardation    - Participation: Active verbal participation  Orientation: Alert and Fully Oriented to person, place and time  Mood: Depressed and Anxious  Affect: Constricted,  Thought Process: Logical and Goal-directed  Thought Content: Denies suicidal or homicidal ideations, intent or plan   Perception: Denies auditory or visual hallucinations. No delusions noted   Attention span and concentration: Intact   Speech:Rate within normal limits and Volume within normal limits  Language: Appropriate   Insight: Good  Judgment: Good  Recent and remote memory: No gross evidence of memory deficits      DEPRESSION SCREENIN/26/2024     8:00 AM   Depression Screen (PHQ-2/PHQ-9)   PHQ-2 Total Score 0       Interpretation of PHQ-9 Total Score   Score Severity   1-4 No Depression   5-9 Mild Depression   10-14 Moderate Depression   15-19 Moderately Severe Depression   20-27 Severe Depression      SAFETY ASSESSMENT - SELF:    Does patient acknowledge current or past symptoms of dangerousness to self? no  History of suicide by family member: no  History of suicide by friend/significant other: no  Recent change in amount/specificity/intensity of suicidal thoughts or self-harm behavior? no  Current access to firearms, medications, or other identified means of suicide/self-harm? no  Protective factors present: family, work       SAFETY ASSESSMENT - OTHERS:    Does patient acknowledge current or past symptoms of aggressive behavior or risk to others? no  Recent change in amount/specificity/intensity of thoughts or threats to harm others? no  Current access to firearms/other  identified means of harm? no       CURRENT RISK:       Suicidal: Low       Homicidal: Low       Self-Harm: Low       Relapse: Low       Crisis Safety Plan Reviewed Not Indicated    MEDICAL RECORDS/LABS/DIAGNOSTIC TESTS REVIEWED:  Reviewed      [place]  records -   Reviewed      ASSESSMENT PLAN:    (1) Bipolar disorder vs MDD; (2) JEANNA  PHQ9: 11; GAD7: 12  Add Lamictal 25 mg daily X 10 days --> 50 mg daily  10 days --> 75 mg daily for mood stabilization.  Continue psychotherapy for mood and anxiety.   Medication options, alternatives (including no medications) and medication risks/benefits/side effects were discussed in detail.  The patient was advised to call, message provider on [employer], or come in to the clinic if symptoms worsen or if any future questions/issues regarding their medications arise; the patient verbalized understanding and agreement.    The patient was educated to call 911, call the suicide hotline, or go to local ER if having thoughts of suicide or homicide; verbalized understanding.        Return to clinic in 1 month or sooner if symptoms worsen.  Next Appointment:  instruction provided on how to make the next appointment.     The proposed treatment plan was discussed with the patient who was provided the opportunity to ask questions and make suggestions regarding alternative treatment. Patient verbalized understanding and expressed agreement with the plan.     Thank you for allowing me to participate in the care of this patient.    Mehrdad Reddy M.D.  12/03/24    CC:   Carlitos Bailey M.D.    This note was created using voice recognition software (Dragon). The accuracy of the dictation is limited by the abilities of the software. I have reviewed the note prior to signing, however some errors in grammar and context are still possible. If you have any questions related to this note please do not hesitate to contact our office.

## 2024-12-04 DIAGNOSIS — F31.9 BIPOLAR 1 DISORDER (HCC): ICD-10-CM

## 2024-12-04 RX ORDER — LAMOTRIGINE 25 MG/1
TABLET ORAL
Qty: 90 TABLET | Refills: 1 | Status: SHIPPED | OUTPATIENT
Start: 2024-12-04 | End: 2024-12-31

## 2024-12-09 ENCOUNTER — OFFICE VISIT (OUTPATIENT)
Dept: BEHAVIORAL HEALTH | Facility: CLINIC | Age: 24
End: 2024-12-09
Payer: COMMERCIAL

## 2024-12-09 DIAGNOSIS — F31.0 BIPOLAR AFFECTIVE DISORDER, CURRENT EPISODE HYPOMANIC (HCC): ICD-10-CM

## 2024-12-09 PROCEDURE — 90837 PSYTX W PT 60 MINUTES: CPT | Performed by: MARRIAGE & FAMILY THERAPIST

## 2024-12-09 NOTE — PROGRESS NOTES
[facility]   Therapy Progress Note    Name: Sergio Dave  MRN: [id number]  : 2000  Age: 24 y.o.  Date of assessment: 2024  PCP: Carlitos Bailey M.D.  Persons in attendance: Patient  Total session time: 55 min    Pt reports: Pt feels his psychiatric eval went 'Great. He [Dr Reddy] listens.' Pt  resuming lamotrigine, he's willing to be patient, in expecting a benefit from it. Pt has been very busy looking for job, and with doctor appts, and working--'not leaving time for much else.'    Given my proposed Tx strategy which is how Pt wanted to proceed, [the 'Tx triad:' talk therapy and self-care, supported by medication], Pt wants to start Tx in focusing on his relationship domain. Presently, not actively in or is he seeking a relationship. Describes himself as passive when he's out with friends. Friends include similar age peers, some older, 60% guys, 40% girls. Pt's friends are college friends, and friends of friends, they share winter sports interests,  sometimes goes out to socialize. The drinking has 'slowed down a lot; one a month or so.' Past relationships? 'Not really, except for a year-long, FWB, sort of thing. Lots of hooking up, casual sex.'    What does Pt see as a desirable in a relationship: qualities, values, behaviors, etc? 'I don't know!--since I've never been in one.' Topics which were discussed: values, communication, give-and-take, ability to commit time to a relationship, physical attraction. Where does a anastasiia, like you, meet a woman? At the bar, [place] the mountain, at work, friends of friends, out and about in community, at the gym, online dating sites, and other options 'in the wild.'    Parents split up when Pt just finished HS,  when Pt 17 y-o. None of his siblings are .    Next time, Pt wants to discuss, how he feels his self confidence is lacking, and likewise feels he's lacking in financial status, intimidated to approach women, often chooses, finds it  more easy to talk to men v women.    Objective Observations:   Participation:Active verbal participation; Pt commented a few times how he's 'being transparent,' that is, it's difficult for him to take a fearless, honest inventory of himself, yet he's willing to do so, so he can potentially feel some important growth   Grooming:Casual   Cognition:Alert   Eye Contact:Good   Mood:Euthymic, anxious when discussing himself   Affect:Flexible   Thought Process:Goal-directed   Speech:Soft    Current Risk:   Suicide: low   Homicide: low   Self-Harm: low    Evaluation and Plan: We're beginning to develop a plan, based on Pt's identified preferences, in the domain of supporting Pt's improved social and interpersonal functioning, that is: his self care    Care Plan Updated: Yes    Does patient express agreement with the treatment plan? Yes     Diagnosis: F31.9 Bipolar I disorder    MARK Martinez

## 2024-12-30 DIAGNOSIS — F31.9 BIPOLAR 1 DISORDER (HCC): ICD-10-CM

## 2024-12-31 RX ORDER — LAMOTRIGINE 25 MG/1
75 TABLET ORAL DAILY
Qty: 270 TABLET | Refills: 0 | Status: SHIPPED | OUTPATIENT
Start: 2024-12-31

## 2025-01-02 ENCOUNTER — APPOINTMENT (OUTPATIENT)
Dept: BEHAVIORAL HEALTH | Facility: CLINIC | Age: 25
End: 2025-01-02
Payer: COMMERCIAL

## 2025-01-02 DIAGNOSIS — F31.61 BIPOLAR DISORDER, CURRENT EPISODE MIXED, MILD (HCC): ICD-10-CM

## 2025-01-02 PROCEDURE — 90837 PSYTX W PT 60 MINUTES: CPT | Performed by: MARRIAGE & FAMILY THERAPIST

## 2025-01-02 NOTE — PROGRESS NOTES
[facility]   Therapy Progress Note    Name: Sergio Dave  MRN: [id number]  : 2000  Age: 24 y.o.  Date of assessment: 2025  PCP: Carlitos Bailey M.D.  Persons in attendance: Patient  Total session time: 55 min    Pt reports: He spent a lot of time with family and friends over holidays; he enjoyed it, yet he again finds self comparing himself to his friends, their level of success, their accomplishments. He gets down on himself when he does this. I proposed a different way of handling these encounters, from a CBT perspective, in the interest of improving his social and interpersonal functioning.    Has a plan/strategy, but hasn't had opportunity to 'deploy it just yet.' That is Pt has not had much social contact since last session. 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    Pt feels lamotrigine working well, in spite of not taking them for a week over [facility], is now back on track.    Objective Observations:   Participation:Active verbal participation              Grooming:Casual              Cognition:Alert              Eye Contact:Good              Mood:Euthymic, anxious when discussing himself--yet he does so anyway              Affect:Flexible              Thought Process:Goal-directed              Speech:Soft     Current Risk:              Suicide: low              Homicide: low              Self-Harm: low    Evaluation and Plan: We're furthering a plan, developing specifics, based on Pt's identified preferences, in the domain of supporting Pt's improved social and interpersonal functioning, that is, primarily, his self care. We'[re  staying focused on the 'Tx Triad:' self-care, medications, supported by talk-therapy.    Diagnosis: F31.9 Bipolar I disorder     MARK Martinez

## 2025-01-15 ENCOUNTER — OFFICE VISIT (OUTPATIENT)
Dept: BEHAVIORAL HEALTH | Facility: CLINIC | Age: 25
End: 2025-01-15
Payer: COMMERCIAL

## 2025-01-15 DIAGNOSIS — F31.9 BIPOLAR 1 DISORDER (HCC): ICD-10-CM

## 2025-01-15 DIAGNOSIS — F41.1 GAD (GENERALIZED ANXIETY DISORDER): ICD-10-CM

## 2025-01-15 PROCEDURE — 99214 OFFICE O/P EST MOD 30 MIN: CPT | Performed by: PSYCHIATRY & NEUROLOGY

## 2025-01-15 PROCEDURE — 90833 PSYTX W PT W E/M 30 MIN: CPT | Performed by: PSYCHIATRY & NEUROLOGY

## 2025-01-15 RX ORDER — LAMOTRIGINE 100 MG/1
200 TABLET ORAL DAILY
Qty: 180 TABLET | Refills: 1 | Status: SHIPPED | OUTPATIENT
Start: 2025-01-15

## 2025-01-15 NOTE — PROGRESS NOTES
PSYCHIATRY FOLLOW-UP NOTE      Name: Sergio Dave  MRN: [id number]  : 2000  Age: 24 y.o.  Date of assessment: 1/15/2025  PCP: Carlitos Bailey M.D.  Persons in attendance: Patient      REASON FOR VISIT/CHIEF COMPLAINT (as stated by Patient):  Sergio Dave is a 24 y.o., White male, attending follow-up appointment for mood and anxiety management.      HISTORY OF PRESENT ILLNESS:  Sergio Dave is a 24 y.o. old male with bipolar disorder and JEANNA comes in today for follow up. Patient was last seen 6 weeks ago, and following treatment planning recommendations were done:  Add Lamictal 25 mg daily X 10 days --> 50 mg daily  10 days --> 75 mg daily for mood stabilization.  Continue psychotherapy for mood and anxiety.     History of Present Illness    He has been on a regimen of lamotrigine 100 mg for approximately 2 weeks but reports no discernible change in his condition.   He experienced a brief interruption in his medication schedule due to a [place], during which he missed 4 doses. Upon resumption of the medication, he did not observe any adverse reactions such as rashes.   He has been maintaining consistent use of his hygiene products and has not noticed any negative effects from the lamotrigine.   He does not report any exacerbation of manic symptoms or deepening of depressive symptoms while on lamotrigine.   He recently started a new job, which has necessitated an earlier wake-up time. His current work schedule is from 6:30 AM to 3:30 PM, with a wake-up time between 4:50 AM and 5:00 AM.   He acknowledges a tendency towards depression and anxiety, particularly in unfamiliar environments.   He reports no auditory hallucinations or visual hallucinations. He has abstained from cannabis use.   Agreed with plan of titrating lamotrigine as discussed below.      PSYCHOTHERAPY ASPECT OF SESSION (20 MIN):  Session was dedicated to letting patient express his feelings related to recent changes from both  positive and negative standpoint.  Importance of not discounting the positive was emphasized.  Discussed various skills he can implement on a daily basis including implementation of positive affirmation, taking a pause shifting the mindset/thinking from negative outlook to positive/realistic outlook.  Importance of taking persistent action was emphasized irrespective of the results.  Later part of the session was dedicated to psychoeducation, active listening and implementing supportive therapy.      CURRENT MEDICATIONS:  Current Outpatient Medications   Medication Sig Dispense Refill    lamoTRIgine (LAMICTAL) 25 MG Tab Take 3 Tablets by mouth every day. 270 Tablet 0    tadalafil (CIALIS) 5 MG tablet Take 5 mg by mouth as needed.       No current facility-administered medications for this visit.       MEDICAL HISTORY  Past Medical History:   Diagnosis Date    Anxiety     Depression      No past surgical history on file.    PAST PSYCHIATRIC MEDICATIONS  Lithium, Depakote, Lamictal  Haldol, Thorazine, Risperidone, Seroquel, Clozapine  Klonopin     REVIEW OF SYSTEMS:        Constitutional negative   Eyes negative   Ears/Nose/Mouth/Throat negative   Cardiovascular negative   Respiratory negative   Gastrointestinal negative   Genitourinary negative   Muscular negative   Integumentary negative   Neurological negative   Endocrine negative   Hematologic/Lymphatic negative     PHYSICAL EXAMINAION:  Vital signs: There were no vitals taken for this visit.  Musculoskeletal: Normal gait.   Abnormal movements: none      MENTAL STATUS EXAMINATION      General:   - Grooming and hygiene: Casual,   - Apparent distress: tense,   - Behavior: Tense  - Eye Contact:  Good,   - no psychomotor agitation or retardation    - Participation: Active verbal participation  Orientation: Alert and Fully Oriented to person, place and time  Mood: Depressed and Anxious  Affect: Constricted,  Thought Process: Logical and Goal-directed  Thought Content:  Denies suicidal or homicidal ideations, intent or plan   Perception: Denies auditory or visual hallucinations. No delusions noted   Attention span and concentration: Intact   Speech:Rate within normal limits and Volume within normal limits  Language: Appropriate   Insight: Good  Judgment: Good  Recent and remote memory: No gross evidence of memory deficits        DEPRESSION SCREENIN/26/2024     8:00 AM 12/3/2024     1:00 PM   Depression Screen (PHQ-2/PHQ-9)   PHQ-2 Total Score 0 3   PHQ-9 Total Score  11       Interpretation of PHQ-9 Total Score   Score Severity   1-4 No Depression   5-9 Mild Depression   10-14 Moderate Depression   15-19 Moderately Severe Depression   20-27 Severe Depression    CURRENT RISK:       Suicidal: Low       Homicidal: Low       Self-Harm: Low       Relapse: Low       Crisis Safety Plan Reviewed Not Indicated       If evidence of imminent risk is present, intervention/plan:      MEDICAL RECORDS/LABS/DIAGNOSTIC TESTS REVIEWED:  No new lab since last visit     [place]  records -   Reviewed     (1) Bipolar disorder vs MDD; (2) JEANNA  Depression and anxiety persisting. No ryan or psychosis  Increase Lamictal 150 mg daily X 15 days --> 200 mg daily for mood stabilization.  Continue psychotherapy for mood and anxiety.   Medication options, alternatives (including no medications) and medication risks/benefits/side effects were discussed in detail.  The patient was advised to call, message provider on [employer], or come in to the clinic if symptoms worsen or if any future questions/issues regarding their medications arise; the patient verbalized understanding and agreement.    The patient was educated to call 911, call the suicide hotline, or go to local ER if having thoughts of suicide or homicide; verbalized understanding.    Billing Coding based on:  [id number] based on [facility]  [id number]: based on psychotherapy timing    Return to clinic in 1 month or sooner if symptoms worsen.  Next Appointment:  instruction provided on how to make the next appointment.     The proposed treatment plan was discussed with the patient who was provided the opportunity to ask questions and make suggestions regarding alternative treatment. Patient verbalized understanding and expressed agreement with the plan.       Mehrdad Reddy M.D.  01/15/25    This note was created using voice recognition software (Dragon). The accuracy of the dictation is limited by the abilities of the software. I have reviewed the note prior to signing, however some errors in grammar and context are still possible. If you have any questions related to this note please do not hesitate to contact our office.

## 2025-02-04 ENCOUNTER — APPOINTMENT (OUTPATIENT)
Dept: BEHAVIORAL HEALTH | Facility: CLINIC | Age: 25
End: 2025-02-04
Payer: COMMERCIAL

## 2025-02-12 ENCOUNTER — OFFICE VISIT (OUTPATIENT)
Dept: BEHAVIORAL HEALTH | Facility: CLINIC | Age: 25
End: 2025-02-12
Payer: COMMERCIAL

## 2025-02-12 DIAGNOSIS — F41.1 GAD (GENERALIZED ANXIETY DISORDER): ICD-10-CM

## 2025-02-12 DIAGNOSIS — F31.9 BIPOLAR 1 DISORDER (HCC): ICD-10-CM

## 2025-02-12 RX ORDER — LAMOTRIGINE 100 MG/1
200 TABLET ORAL DAILY
Qty: 180 TABLET | Refills: 1 | Status: SHIPPED | OUTPATIENT
Start: 2025-02-12

## 2025-02-12 RX ORDER — BUSPIRONE HYDROCHLORIDE 10 MG/1
10 TABLET ORAL 2 TIMES DAILY
Qty: 60 TABLET | Refills: 1 | Status: SHIPPED | OUTPATIENT
Start: 2025-02-12

## 2025-02-12 NOTE — PROGRESS NOTES
PSYCHIATRY FOLLOW-UP NOTE      Name: Sergio Dave  MRN: [id number]  : 2000  Age: 24 y.o.  Date of assessment: 2025  PCP: Carlitos Bailey M.D.  Persons in attendance: Patient      REASON FOR VISIT/CHIEF COMPLAINT (as stated by Patient):  Sergio Dave is a 24 y.o., White male, attending follow-up appointment for mood and anxiety management.      HISTORY OF PRESENT ILLNESS:  Sergio Dave is a 24 y.o. old male with bipolar disorder and JEANNA comes in today for follow up. Patient was last seen 1 month ago, and following treatment planning recommendations were done:  Increase Lamictal 150 mg daily X 15 days --> 200 mg daily for mood stabilization.  Continue psychotherapy for mood and anxiety.     History of Present Illness    Patient messaged me on 25 (3 weeks ago): I had a rash/ or pimples develop around my chest. It is uncomfortable/ hot in sensation however not quite itchy. Potentially such pimples or rash are developing on my upper back/shoulders   Discussed: I will recommend going down to 75 mg dose of lamotrigine for next 10 days and then try increasing the dose to 100 mg again. If rash is not resolving with this approach, you can stop the lamotrigine and we can discuss other medication option in your next appointment.     He reports that the rash has resolved, which he attributes to a possible reaction to clothing. The rash was not itchy but resembled acne.   His current medication regimen includes Lamictal 100 mg but with persistence of depression symptoms (but no manic or psychotic symptoms), with a planned increase to 150 mg, and potentially 200 mg if no adverse effects are observed.   Over the past few weeks, he has been grappling with depression and anxiety, with occasional bouts of anger and frustration, which he deems appropriate given his circumstances.   He has recently started a new job with an earlier schedule, which he prefers over his previous employment.  His sleep pattern remains  inconsistent, averaging 5 to 6 hours per night, and he does not feel rested upon waking. He is reluctant to start any sleep medications. He plans to implement behavioral modifications at bedtime and will consider discussing further treatment options if these changes do not improve his sleep.    Agreed with adding BuSpar with lamictal titration but agreed with future plan of cautiously considering low-dose antidepressant if indicated for depression and anxiety management but patient understood the risk of manic switches with antidepressants.    PSYCHOTHERAPY ASPECT OF SESSION (16 MIN):  Session was dedicated to letting patient express his feelings related to recent stressors.  Importance of  appropriate from intrusive emotional reactivity was emphasized and validation was provided for appropriate emotional responses.  Most part of the later session was dedicated to psychoeducation, active listening and answering patient's questions.      CURRENT MEDICATIONS:  Current Outpatient Medications   Medication Sig Dispense Refill    lamoTRIgine (LAMICTAL) 100 MG Tab Take 2 Tablets by mouth every day. 180 Tablet 1    tadalafil (CIALIS) 5 MG tablet Take 5 mg by mouth as needed.       No current facility-administered medications for this visit.       MEDICAL HISTORY  Past Medical History:   Diagnosis Date    Anxiety     Depression      No past surgical history on file.      PAST PSYCHIATRIC MEDICATIONS  Lithium, Depakote, Lamictal  Haldol, Thorazine, Risperidone, Seroquel, Clozapine  Klonopin     REVIEW OF SYSTEMS:        Constitutional negative   Eyes negative   Ears/Nose/Mouth/Throat negative   Cardiovascular negative   Respiratory negative   Gastrointestinal negative   Genitourinary negative   Muscular negative   Integumentary negative   Neurological negative   Endocrine negative   Hematologic/Lymphatic negative     PHYSICAL EXAMINAION:  Vital signs: There were no vitals taken for this visit.  Musculoskeletal:  Normal gait.   Abnormal movements: none      MENTAL STATUS EXAMINATION      General:   - Grooming and hygiene: Casual,   - Apparent distress: tense,   - Behavior: Tense  - Eye Contact:  Good,   - no psychomotor agitation or retardation    - Participation: Active verbal participation  Orientation: Alert and Fully Oriented to person, place and time  Mood: Depressed and Anxious  Affect: Constricted,  Thought Process: Logical and Goal-directed  Thought Content: Denies suicidal or homicidal ideations, intent or plan   Perception: Denies auditory or visual hallucinations. No delusions noted   Attention span and concentration: Intact   Speech:Rate within normal limits and Volume within normal limits  Language: Appropriate   Insight: Good  Judgment: Good  Recent and remote memory: No gross evidence of memory deficits        DEPRESSION SCREENIN/26/2024     8:00 AM 12/3/2024     1:00 PM   Depression Screen (PHQ-2/PHQ-9)   PHQ-2 Total Score 0 3   PHQ-9 Total Score  11       Interpretation of PHQ-9 Total Score   Score Severity   1-4 No Depression   5-9 Mild Depression   10-14 Moderate Depression   15-19 Moderately Severe Depression   20-27 Severe Depression    CURRENT RISK:       Suicidal: Low       Homicidal: Low       Self-Harm: Low       Relapse: Low       Crisis Safety Plan Reviewed Not Indicated       If evidence of imminent risk is present, intervention/plan:      MEDICAL RECORDS/LABS/DIAGNOSTIC TESTS REVIEWED:  No new lab since last visit     [place]  records -   Reviewed       (1) Bipolar disorder vs MDD; (2) JEANNA  Depression and anxiety persisting. No ryan or psychosis  Increase Lamictal 150 mg daily X 15 days --> 200 mg daily for mood stabilization.  Add Buspar 5 mg BID X 1 week --> 10 mg BID X 1 week --> 15 mg BID for anxiety.  Consider Latuda or low dose antidepressant if indicated in upcoming sessions.  Continue psychotherapy for mood and anxiety.   Medication options, alternatives (including no medications)  and medication risks/benefits/side effects were discussed in detail.  The patient was advised to call, message provider on MyChart, or come in to the clinic if symptoms worsen or if any future questions/issues regarding their medications arise; the patient verbalized understanding and agreement.    The patient was educated to call 911, call the suicide hotline, or go to local ER if having thoughts of suicide or homicide; verbalized understanding.      Billing Coding based on:  [id number] based on [facility]  [id number]: based on psychotherapy timing    Return to clinic in 1 month or sooner if symptoms worsen.  Next Appointment: instruction provided on how to make the next appointment.     The proposed treatment plan was discussed with the patient who was provided the opportunity to ask questions and make suggestions regarding alternative treatment. Patient verbalized understanding and expressed agreement with the plan.       Mehrdad Reddy M.D.  02/12/25    This note was created using voice recognition software (Dragon). The accuracy of the dictation is limited by the abilities of the software. I have reviewed the note prior to signing, however some errors in grammar and context are still possible. If you have any questions related to this note please do not hesitate to contact our office.

## 2025-02-24 ENCOUNTER — OFFICE VISIT (OUTPATIENT)
Dept: BEHAVIORAL HEALTH | Facility: CLINIC | Age: 25
End: 2025-02-24
Payer: COMMERCIAL

## 2025-02-24 DIAGNOSIS — F31.9 BIPOLAR AFFECTIVE DISORDER, REMISSION STATUS UNSPECIFIED (HCC): ICD-10-CM

## 2025-02-25 NOTE — PROGRESS NOTES
[facility]   Therapy Progress Note    Name: Sergio Dave  MRN: [id number]  : 2000  Age: 24 y.o.  Date of assessment: 2025  PCP: Carlitos Bailey M.D.  Persons in attendance: Patient  Total session time: 55 min    Pt reports: regarding his new job: 'I don't hate going to work.' Indeed, his job fits well with his career goals at present. He states, regarding his desire to move out of [facility], 'I'm [place]ree in [facility] right now, I may move to [place] to stay with mother, temporarily, on the way to hoped to be [place].' Pt pondered what it may be like if he moves out of [facility], away from his friends, away from family. We discussed how he might improve his sleep.    Pt has been 'relatively busy' with work, finances,  on-line certifications, planning a likely move, which has prevented him from 'deploying his plan' to increase his socialization, yet he's ok with this, as his career and move are now his priority. Pt feeling focusing on his career, moving, and  working out, attending to his diet, articulating how this needs to be his focus.    Pt feeling 'I'm starting to feel I'm beginning to benefit from the medications.' Pt again stated his appreciation in working with his psychiatrist.     Objective Observations:  Participation:Active verbal participation              Grooming:Casual              Cognition:Alert              Eye Contact:Good              Mood:Euthymic, anxious and somewhat embarrassed, self-critical, when discussing himself--yet he does so anyway              Affect:Flexible              Thought Process:Goal-directed              Speech:normal     Current Risk:              Suicide: low              Homicide: low              Self-Harm: low    Evaluation: Support Pt in his focus on the T[place] triad: self-care, medications, supported by talk-therapy. He is able to speak freely of how he is focused on all 3 components, while positioning himself to move away from [facility]. Pt is  honest, candid, conversational, while articulating what he wants to achieve for himself, given longstanding Sx of a mood disorder.    Care Plan Updated: Yes    Does patient express agreement with the treatment plan? Yes     Diagnosis: F31.9 Bipolar I disorder     MARK Martinez

## 2025-03-05 ENCOUNTER — APPOINTMENT (OUTPATIENT)
Dept: BEHAVIORAL HEALTH | Facility: CLINIC | Age: 25
End: 2025-03-05
Payer: COMMERCIAL

## 2025-03-05 DIAGNOSIS — F31.9 BIPOLAR AFFECTIVE DISORDER, REMISSION STATUS UNSPECIFIED (HCC): ICD-10-CM

## 2025-03-05 PROCEDURE — 90837 PSYTX W PT 60 MINUTES: CPT | Performed by: MARRIAGE & FAMILY THERAPIST

## 2025-03-05 NOTE — PROGRESS NOTES
[facility]   Therapy Progress Note    Name: Sergio Dave  MRN: [id number]  : 2000  Age: 24 y.o.  Date of assessment: 3/5/2025  PCP: Carlitos Bailey M.D.  Persons in attendance: Patient  Total session time: 55 m    Pt reports: he cont to stay focused on work, finances, on-line certifications, and actively planning a likely move to [facility]. as his career and move are now his priority. Pt feeling that focusing on his career, moving, working out, and attending to his diet, needs to be his focus. Pt now moving to [place], [place], as a temporary stopping point, on his way to [facility], he expects to leave [place] by the end of 2025!    Pt has been feeling frustrated, disappointed, excited, discouraged about his career track, financial situation. I supported Pt in discussing this, and how he might focus and channel these strong feelings to serve him and motivate him rather than pull him down.    Feels current medications have plateaued, hasn't felt any more, consistent, benefit in a couple weeks, yet he's taking them as Rx'd, and intends to discuss his concerns with his prescriber.    Objective Observations:  Participation:Active verbal participation and very open to feedback              Grooming:Casual              Cognition:Alert              Eye Contact:Good              Mood:Euthymic, anxious and somewhat embarrassed, self-critical, when discussing himself--yet he does so anyway              Affect:Flexible              Thought Process:Goal-directed              Speech:normal     Current Risk:              Suicide: low              Homicide: low              Self-Harm: low    Evaluation: Pt skillfully pivoted, changing his goal for himself, and openly discussed his strategy for achieving his goal    Diagnosis: F31.9 Bipolar I disorder      MARK Martinez

## 2025-03-08 DIAGNOSIS — F41.1 GAD (GENERALIZED ANXIETY DISORDER): ICD-10-CM

## 2025-03-11 RX ORDER — BUSPIRONE HYDROCHLORIDE 10 MG/1
10 TABLET ORAL 2 TIMES DAILY
Qty: 180 TABLET | Refills: 1 | Status: SHIPPED | OUTPATIENT
Start: 2025-03-11 | End: 2025-03-13

## 2025-03-13 ENCOUNTER — OFFICE VISIT (OUTPATIENT)
Dept: BEHAVIORAL HEALTH | Facility: CLINIC | Age: 25
End: 2025-03-13
Payer: COMMERCIAL

## 2025-03-13 DIAGNOSIS — F41.1 GAD (GENERALIZED ANXIETY DISORDER): ICD-10-CM

## 2025-03-13 DIAGNOSIS — F31.9 BIPOLAR 1 DISORDER (HCC): ICD-10-CM

## 2025-03-13 PROCEDURE — 99214 OFFICE O/P EST MOD 30 MIN: CPT | Performed by: PSYCHIATRY & NEUROLOGY

## 2025-03-13 RX ORDER — LAMOTRIGINE 100 MG/1
200 TABLET ORAL DAILY
Qty: 180 TABLET | Refills: 1 | Status: SHIPPED | OUTPATIENT
Start: 2025-03-13

## 2025-03-13 RX ORDER — ESCITALOPRAM OXALATE 10 MG/1
10 TABLET ORAL EVERY MORNING
Qty: 90 TABLET | Refills: 1 | Status: SHIPPED | OUTPATIENT
Start: 2025-03-13

## 2025-03-13 NOTE — PROGRESS NOTES
PSYCHIATRY FOLLOW-UP NOTE      Name: Sergio Dave  MRN: [id number]  : 2000  Age: 24 y.o.  Date of assessment: 3/13/2025  PCP: Carlitos Bailey M.D.  Persons in attendance: Patient      REASON FOR VISIT/CHIEF COMPLAINT (as stated by Patient):  Sergio Dave is a 24 y.o., White male, attending follow-up appointment for mood and anxiety management.      HISTORY OF PRESENT ILLNESS:  Sergio Dave is a 24 y.o. old male with bipolar disorder and JEANNA comes in today for follow up. Patient was last seen 1 month ago, and following treatment planning recommendations were done:  Increase Lamictal 150 mg daily X 15 days --> 200 mg daily for mood stabilization.  Add Buspar 5 mg BID X 1 week --> 10 mg BID X 1 week --> 15 mg BID for anxiety.  Consider Latuda or low dose antidepressant if indicated in upcoming sessions.  Continue psychotherapy for mood and anxiety.     Patient is currently on Lamictal 200 and taking BuSpar 10 mg twice daily but reports persistence of depression and anxiety.  On detailed discussion he describes frustration and irritability as depression and anxiety.  We have engaged in psychotherapy as well.  Agreed with plan of cautiously adding low-dose Lexapro as discussed below and stopping the BuSpar to prevent polypharmacy.  Patient has decided to move to [place] in few weeks to live with mother for financial reasons.  Agreed with meeting one more time to assess how Lexapro and patient did and then he will work on finding a new psychiatrist in [place].      CURRENT MEDICATIONS:  Current Outpatient Medications   Medication Sig Dispense Refill    busPIRone (BUSPAR) 10 MG Tab tablet TAKE 1 TABLET BY MOUTH TWICE A  Tablet 1    lamoTRIgine (LAMICTAL) 100 MG Tab Take 2 Tablets by mouth every day. 180 Tablet 1    tadalafil (CIALIS) 5 MG tablet Take 5 mg by mouth as needed.       No current facility-administered medications for this visit.       MEDICAL HISTORY  Past Medical History:    Diagnosis Date    Anxiety     Depression      No past surgical history on file.      PAST PSYCHIATRIC MEDICATIONS  Lithium, Depakote, Lamictal  Haldol, Thorazine, Risperidone, Seroquel, Clozapine  Klonopin     REVIEW OF SYSTEMS:        Constitutional negative   Eyes negative   Ears/Nose/Mouth/Throat negative   Cardiovascular negative   Respiratory negative   Gastrointestinal negative   Genitourinary negative   Muscular negative   Integumentary negative   Neurological negative   Endocrine negative   Hematologic/Lymphatic negative     PHYSICAL EXAMINAION:  Vital signs: There were no vitals taken for this visit.  Musculoskeletal: Normal gait.   Abnormal movements: none      MENTAL STATUS EXAMINATION      General:   - Grooming and hygiene: Casual,   - Apparent distress: none,   - Behavior: Calm  - Eye Contact:  Good,   - no psychomotor agitation or retardation    - Participation: Active verbal participation  Orientation: Alert and Fully Oriented to person, place and time  Mood: Depressed and Anxious  Affect: Constricted,  Thought Process: Logical and Goal-directed  Thought Content: Denies suicidal or homicidal ideations, intent or plan   Perception: Denies auditory or visual hallucinations. No delusions noted   Attention span and concentration: Intact   Speech:Rate within normal limits and Volume within normal limits  Language: Appropriate   Insight: Good  Judgment: Good  Recent and remote memory: No gross evidence of memory deficits        DEPRESSION SCREENIN/26/2024     8:00 AM 12/3/2024     1:00 PM   Depression Screen (PHQ-2/PHQ-9)   PHQ-2 Total Score 0 3   PHQ-9 Total Score  11       Interpretation of PHQ-9 Total Score   Score Severity   1-4 No Depression   5-9 Mild Depression   10-14 Moderate Depression   15-19 Moderately Severe Depression   20-27 Severe Depression    CURRENT RISK:       Suicidal: Low       Homicidal: Low       Self-Harm: Low       Relapse: Low       Crisis Safety Plan Reviewed Not  Indicated       If evidence of imminent risk is present, intervention/plan:      MEDICAL RECORDS/LABS/DIAGNOSTIC TESTS REVIEWED:  No new lab since last visit     [place]  records -   Reviewed     (1) Bipolar disorder vs MDD; (2) JEANNA  Depression and anxiety persisting. No ryan or psychosis  Continue Lamictal 200 mg daily for mood stabilization.  Stop Buspar   Add Lexapro 5 mg daily X 1 week --> 10 mg daily for depression and anxiety.  Continue psychotherapy for mood and anxiety.   Medication options, alternatives (including no medications) and medication risks/benefits/side effects were discussed in detail.  The patient was advised to call, message provider on [employer] for [facility], or come in to the clinic if symptoms worsen or if any future questions/issues regarding their medications arise; the patient verbalized understanding and agreement.    The patient was educated to call 911, call the suicide hotline, or go to local ER if having thoughts of suicide or homicide; verbalized understanding.      Billing Coding based on:  24717 based on MDM    Return to clinic in 1 month or sooner if symptoms worsen.  Next Appointment: instruction provided on how to make the next appointment.     The proposed treatment plan was discussed with the patient who was provided the opportunity to ask questions and make suggestions regarding alternative treatment. Patient verbalized understanding and expressed agreement with the plan.       Mehrdad Reddy M.D.  03/13/25    This note was created using voice recognition software (Dragon). The accuracy of the dictation is limited by the abilities of the software. I have reviewed the note prior to signing, however some errors in grammar and context are still possible. If you have any questions related to this note please do not hesitate to contact our office.

## 2025-03-18 ENCOUNTER — APPOINTMENT (OUTPATIENT)
Dept: BEHAVIORAL HEALTH | Facility: CLINIC | Age: 25
End: 2025-03-18
Payer: COMMERCIAL

## 2025-04-10 ENCOUNTER — OFFICE VISIT (OUTPATIENT)
Dept: BEHAVIORAL HEALTH | Facility: CLINIC | Age: 25
End: 2025-04-10
Payer: COMMERCIAL

## 2025-04-10 DIAGNOSIS — F31.31 BIPOLAR AFFECTIVE DISORDER, CURRENTLY DEPRESSED, MILD (HCC): ICD-10-CM

## 2025-04-10 PROCEDURE — 90837 PSYTX W PT 60 MINUTES: CPT | Performed by: MARRIAGE & FAMILY THERAPIST

## 2025-04-10 NOTE — PROGRESS NOTES
[facility]   Therapy Progress Note    Name: Sergio Dave  MRN: [id number]  : 2000  Age: 24 y.o.  Date of assessment: 4/10/2025  PCP: Carlitos Bailey M.D.  Persons in attendance: Patient  Total session time: 53 m    Pt reports: he is going to make a lateral transfer, and receive a pay increase, at his present job, which he enjoys, but doing so likely will dissuade him from moving out of [place]. He's now seriously reconsidering his from moving away from [place]. His final decision has to be based on a number of factors: work, his lease on apartment, moving to  to live with mother, his friends in [facility] who want him to move there.    Pt has been, since last session, socializing some, trying to attend to his finances, his health, was preparing to move: 'I got my apartment cleaned out!'    Objective Observations:  Participation:Active verbal participation and very open to feedback              Grooming:Casual              Cognition:Alert              Eye Contact:Good              Mood:Euthymic, anxious about having to decide--quickly--if he's going to stay in [place], or move to [place]              Affect:Flexible              Thought Process:Goal-directed              Speech:normal     Current Risk:              Suicide: low              Homicide: low              Self-Harm: low    Care Plan Updated: No    Does patient express agreement with the treatment plan? Yes     Diagnosis: F31.9 Bipolar I disorder     MARK Martinez

## 2025-04-17 ENCOUNTER — OFFICE VISIT (OUTPATIENT)
Dept: BEHAVIORAL HEALTH | Facility: CLINIC | Age: 25
End: 2025-04-17
Payer: COMMERCIAL

## 2025-04-17 DIAGNOSIS — F31.9 BIPOLAR 1 DISORDER (HCC): ICD-10-CM

## 2025-04-17 DIAGNOSIS — F41.1 GAD (GENERALIZED ANXIETY DISORDER): ICD-10-CM

## 2025-04-17 PROCEDURE — 99214 OFFICE O/P EST MOD 30 MIN: CPT | Performed by: PSYCHIATRY & NEUROLOGY

## 2025-04-17 NOTE — PROGRESS NOTES
PSYCHIATRY FOLLOW-UP NOTE      Name: Sergio Dave  MRN: [id number]  : 2000  Age: 24 y.o.  Date of assessment: 2025  PCP: Carlitos Bailey M.D.  Persons in attendance: Patient      REASON FOR VISIT/CHIEF COMPLAINT (as stated by Patient):  Sergio Dave is a 24 y.o., White male, attending follow-up appointment for mood and anxiety management.      HISTORY OF PRESENT ILLNESS:  Sergio Dave is a 24 y.o. old male with bipolar disorder and JEANNA comes in today for follow up. Patient was last seen 1 month ago, and following treatment planning recommendations were done:  Continue Lamictal 200 mg daily for mood stabilization.  Stop Buspar   Add Lexapro 5 mg daily X 1 week --> 10 mg daily for depression and anxiety.  Continue psychotherapy for mood and anxiety.     History of Present Illness    He is currently on a regimen of Lexapro 10 mg, which has been taken for approximately one month.   Slow improvement in mood or anxiety levels are reported, which he attributes to his busy schedule.   Therapy is ongoing, focusing on short-term issues and decision-making processes.  The patient reports being relatively even keel despite many moving parts in his life. He has been able to handle stress well and has not experienced any mood swings, ryan, or impulsivity.   Agreed with plan of not titrating medications further.        CURRENT MEDICATIONS:  Current Outpatient Medications   Medication Sig Dispense Refill    lamoTRIgine (LAMICTAL) 100 MG Tab Take 2 Tablets by mouth every day. 180 Tablet 1    escitalopram (LEXAPRO) 10 MG Tab Take 1 Tablet by mouth every morning. 90 Tablet 1    tadalafil (CIALIS) 5 MG tablet Take 5 mg by mouth as needed.       No current facility-administered medications for this visit.       MEDICAL HISTORY  Past Medical History:   Diagnosis Date    Anxiety     Depression      No past surgical history on file.      PAST PSYCHIATRIC MEDICATIONS  Lithium, Depakote, Lamictal  Haldol, Thorazine,  Risperidone, Seroquel, Clozapine    Lexapro    Buspar, Klonopin     REVIEW OF SYSTEMS:        Constitutional negative   Eyes negative   Ears/Nose/Mouth/Throat negative   Cardiovascular negative   Respiratory negative   Gastrointestinal negative   Genitourinary negative   Muscular negative   Integumentary negative   Neurological negative   Endocrine negative   Hematologic/Lymphatic negative     PHYSICAL EXAMINAION:  Vital signs: There were no vitals taken for this visit.  Musculoskeletal: Normal gait.   Abnormal movements: none      MENTAL STATUS EXAMINATION      General:   - Grooming and hygiene: Casual,   - Apparent distress: none,   - Behavior: Calm  - Eye Contact:  Good,   - no psychomotor agitation or retardation    - Participation: Active verbal participation  Orientation: Alert and Fully Oriented to person, place and time  Mood: Euthymic  Affect: Flexible and Full range,  Thought Process: Logical and Goal-directed  Thought Content: Denies suicidal or homicidal ideations, intent or plan   Perception: Denies auditory or visual hallucinations. No delusions noted   Attention span and concentration: Intact   Speech:Rate within normal limits and Volume within normal limits  Language: Appropriate   Insight: Good  Judgment: Good  Recent and remote memory: No gross evidence of memory deficits        DEPRESSION SCREENIN/26/2024     8:00 AM 12/3/2024     1:00 PM   Depression Screen (PHQ-2/PHQ-9)   PHQ-2 Total Score 0 3   PHQ-9 Total Score  11       Interpretation of PHQ-9 Total Score   Score Severity   1-4 No Depression   5-9 Mild Depression   10-14 Moderate Depression   15-19 Moderately Severe Depression   20-27 Severe Depression    CURRENT RISK:       Suicidal: Low       Homicidal: Low       Self-Harm: Low       Relapse: Low       Crisis Safety Plan Reviewed Not Indicated       If evidence of imminent risk is present, intervention/plan:      MEDICAL RECORDS/LABS/DIAGNOSTIC TESTS REVIEWED:  No new lab since  last visit     [place] [facility] records -   Reviewed     (1) Bipolar disorder vs MDD; (2) JEANNA  improving No ryan or psychosis  Continue Lamictal 200 mg daily for mood stabilization.  Continue Lexapro 10 mg daily for depression and anxiety.  Continue psychotherapy for mood and anxiety.   Medication options, alternatives (including no medications) and medication risks/benefits/side effects were discussed in detail.  The patient was advised to call, message provider on [facility], or come in to the clinic if symptoms worsen or if any future questions/issues regarding their medications arise; the patient verbalized understanding and agreement.    The patient was educated to call 911, call the suicide hotline, or go to local ER if having thoughts of suicide or homicide; verbalized understanding.      Billing Coding based on:  82208 based on MDM    Return to clinic in 1 month or sooner if symptoms worsen.  Next Appointment: instruction provided on how to make the next appointment.     The proposed treatment plan was discussed with the patient who was provided the opportunity to ask questions and make suggestions regarding alternative treatment. Patient verbalized understanding and expressed agreement with the plan.       Mehrdad Reddy M.D.  04/17/25    This note was created using voice recognition software (Dragon). The accuracy of the dictation is limited by the abilities of the software. I have reviewed the note prior to signing, however some errors in grammar and context are still possible. If you have any questions related to this note please do not hesitate to contact our office.

## 2025-04-21 ENCOUNTER — OFFICE VISIT (OUTPATIENT)
Dept: BEHAVIORAL HEALTH | Facility: CLINIC | Age: 25
End: 2025-04-21
Payer: COMMERCIAL

## 2025-04-21 DIAGNOSIS — F31.31 BIPOLAR AFFECTIVE DISORDER, CURRENTLY DEPRESSED, MILD (HCC): ICD-10-CM

## 2025-04-21 PROCEDURE — 90834 PSYTX W PT 45 MINUTES: CPT | Performed by: MARRIAGE & FAMILY THERAPIST

## 2025-04-21 NOTE — PROGRESS NOTES
[facility]   Therapy Progress Note    Name: Sergio Dave  MRN: [id number]  : 2000  Age: 24 y.o.  Date of assessment: 2025  PCP: Carlitos Bailey M.D.  Persons in attendance: Patient  Total session time: 45 m    Pt reports: he's going to stay in [place]--for now. He negotiated a month to month rental in his existing apartment, so he won't have to move--for now. He's wanting to try out new position that he was transferred to: 'It's a little slow--for now--but I'm going to give it try.' Pt states he's pleased with his decision--for now. 'I can feel it out and see how it goes.'  He likes his coworkers on the new team. He explained that he didn't want to move [within [place]] but he now has an 'escape boyce' in having a month-to-month agreement, but can easily leave town if that's what he decides.    He's comfortable with his medications, feeling they are providing him a benefit--for now.    Self care? 'I'll need some time figuring that out.' I queried how, where? 'I don't really have an answer to that right now. Has some on-line fiends to game with.    Pt wanted to discuss my position on emotional support animals.    Objective Observations:  Participation:Active verbal participation and very open to feedback              Grooming:Casual              Cognition:Alert              Eye Contact:Good              Mood:Euthymic, anxious about having to decide--quickly--if he's going to stay in [place], or move to [place]--it's resolved.              Affect:Flexible              Thought Process:Goal-directed              Speech:normal     Current Risk:              Suicide: low              Homicide: low              Self-Harm: low     Care Plan Updated: No, as Pt cont to engage and stay attentive to the 3 parts of the [place] [facility] strategy: self care, and medications, supported by talk therapy. He appears satisfied with his decision, he's conversational, and goal oriented re: work and self-care.     Does patient  express agreement with the treatment plan? Yes      Diagnosis: F31.9 Bipolar I disorder      MARK Martinez

## 2025-05-15 ENCOUNTER — OFFICE VISIT (OUTPATIENT)
Dept: BEHAVIORAL HEALTH | Facility: CLINIC | Age: 25
End: 2025-05-15
Payer: COMMERCIAL

## 2025-05-15 DIAGNOSIS — F31.31 BIPOLAR AFFECTIVE DISORDER, CURRENTLY DEPRESSED, MILD (HCC): Primary | ICD-10-CM

## 2025-05-15 PROCEDURE — 90837 PSYTX W PT 60 MINUTES: CPT | Performed by: MARRIAGE & FAMILY THERAPIST
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
## 2025-05-29 ENCOUNTER — OFFICE VISIT (OUTPATIENT)
Dept: BEHAVIORAL HEALTH | Facility: CLINIC | Age: 25
End: 2025-05-29
Payer: COMMERCIAL

## 2025-05-29 DIAGNOSIS — F31.9 BIPOLAR 1 DISORDER (HCC): Primary | ICD-10-CM

## 2025-05-29 DIAGNOSIS — F41.1 GAD (GENERALIZED ANXIETY DISORDER): ICD-10-CM

## 2025-05-29 RX ORDER — TRAZODONE HYDROCHLORIDE 50 MG/1
50 TABLET ORAL NIGHTLY PRN
Qty: 90 TABLET | Refills: 1 | Status: SHIPPED | OUTPATIENT
Start: 2025-05-29

## 2025-05-29 RX ORDER — LAMOTRIGINE 100 MG/1
200 TABLET ORAL DAILY
Qty: 180 TABLET | Refills: 1 | Status: SHIPPED | OUTPATIENT
Start: 2025-05-29

## 2025-05-29 RX ORDER — ESCITALOPRAM OXALATE 20 MG/1
20 TABLET ORAL DAILY
Qty: 90 TABLET | Refills: 1 | Status: SHIPPED | OUTPATIENT
Start: 2025-05-29

## 2025-05-29 NOTE — PROGRESS NOTES
PSYCHIATRY FOLLOW-UP NOTE      Name: Sergio Dave  MRN: [id number]  : 2000  Age: 24 y.o.  Date of assessment: 2025  PCP: Carlitos Bailey M.D.  Persons in attendance: Patient      REASON FOR VISIT/CHIEF COMPLAINT (as stated by Patient):  Sergio Dave is a 24 y.o., White male, attending follow-up appointment for mood and anxiety management.      HISTORY OF PRESENT ILLNESS:  Sergio Dave is a 24 y.o. old male with mood and anxiety disorder comes in today for follow up. Patient was last seen 1 month ago, and following treatment planning recommendations were done:  Continue Lamictal 200 mg daily for mood stabilization.  Continue Lexapro 10 mg daily for depression and anxiety.  Continue psychotherapy for mood and anxiety.     History of Present Illness    He is currently on Lexapro 10 mg and lamotrigine, both administered in the morning.  He reports a lack of significant improvement in his condition despite adherence to his medication regimen.  He has been managing his increased workload and travel commitments, which have escalated since his last visit. His current work schedule is 40 hours per week, but he anticipates an increase in the coming month and a half. 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 He anticipates a promotion within a month, which will bring additional responsibilities. He also mentions financial struggles since completing his education.   He reports no manic episodes.   He has been experiencing sleep disturbances for approximately a month, which he attributes to stress from work and travel. He recently returned from [place] where he attended a friend's wedding. He reports that today is the first day he feels somewhat rested. He has been unable to engage in his usual outlets of music due to  time constraints. His daily routine involves waking up between 1:00 and 2:00 AM, starting work at 5:00 AM, and retiring to bed around 11:00 PM. He believes 7 hours of sleep would be sufficient for him. He typically wakes up between 4:00 and 4:30 AM. His dinner schedule is inconsistent. He has previously used trazodone for sleep issues.  Agreed with plan of increasing Lexapro to 20 mg (with close monitoring for ryan) and adding Trazodone for sleep.      PSYCHOTHERAPY ASPECT OF SESSION (16 MIN):  Session was dedicated to letting patient express his feelings related to recent increasing stressors from work standpoint.  The concept of burnout from goal of work life balance was emphasized.  Sleep hygiene was discussed in detail and motivated patient to take action on these changes starting today.  Cognitive behavioral therapy model of how automatic thoughts contribute to the emotion was discussed in detail and discussed various examples on how to change these thoughts on a daily basis.  Later part of the session was dedicated to psychoeducation and active listening.      CURRENT MEDICATIONS:  Current Medications[1]    MEDICAL HISTORY  Past Medical History[2]  Past Surgical History[3]      PAST PSYCHIATRIC MEDICATIONS  Lithium, Depakote, Lamictal  Haldol, Thorazine, Risperidone, Seroquel, Clozapine     Lexapro     Buspar, Klonopin     REVIEW OF SYSTEMS:        Constitutional negative   Eyes negative   Ears/Nose/Mouth/Throat negative   Cardiovascular negative   Respiratory negative   Gastrointestinal negative   Genitourinary negative   Muscular negative   Integumentary negative   Neurological negative   Endocrine negative   Hematologic/Lymphatic negative     PHYSICAL EXAMINAION:  Vital signs: There were no vitals taken for this visit.  Musculoskeletal: Normal gait.   Abnormal movements: none      MENTAL STATUS EXAMINATION      General:   - Grooming and hygiene: Casual,   - Apparent distress: tense,   - Behavior: Tense  -  Eye Contact:  Good,   - no psychomotor agitation or retardation    - Participation: Active verbal participation  Orientation: Alert and Fully Oriented to person, place and time  Mood: Anxious  Affect: Flexible,  Thought Process: Logical and Goal-directed  Thought Content: Denies suicidal or homicidal ideations, intent or plan   Perception: Denies auditory or visual hallucinations. No delusions noted   Attention span and concentration: Intact   Speech:Rate within normal limits and Volume within normal limits  Language: Appropriate   Insight: Good  Judgment: Good  Recent and remote memory: No gross evidence of memory deficits        DEPRESSION SCREENIN/26/2024     8:00 AM 12/3/2024     1:00 PM   Depression Screen (PHQ-2/PHQ-9)   PHQ-2 Total Score 0 3   PHQ-9 Total Score  11       Interpretation of PHQ-9 Total Score   Score Severity   1-4 No Depression   5-9 Mild Depression   10-14 Moderate Depression   15-19 Moderately Severe Depression   20-27 Severe Depression    CURRENT RISK:       Suicidal: Low       Homicidal: Low       Self-Harm: Low       Relapse: Low       Crisis Safety Plan Reviewed Not Indicated       If evidence of imminent risk is present, intervention/plan:      MEDICAL RECORDS/LABS/DIAGNOSTIC TESTS REVIEWED:  No new lab since last visit     [place] -   Reviewed     (1) Bipolar disorder vs MDD; (2) JEANNA  Mood and anxiety changes noted. No ryan or hypomania.  Continue Lamictal 200 mg daily for mood stabilization.  Increase Lexapro 20 mg daily for depression and anxiety.  Add Trazodone 41-57- mg HS PRN for sleep.  Continue psychotherapy for mood and anxiety.   Medication options, alternatives (including no medications) and medication risks/benefits/side effects were discussed in detail.  The patient was advised to call, message provider on [facility], or come in to the clinic if symptoms worsen or if any future questions/issues regarding their medications arise; the patient verbalized  understanding and agreement.    The patient was educated to call 911, call the suicide hotline, or go to local ER if having thoughts of suicide or homicide; verbalized understanding.    Billing Coding based on:  50759 based on [facility]  31210: based on psychotherapy timing    Return to clinic in 1 month or sooner if symptoms worsen.  Next Appointment: instruction provided on how to make the next appointment.     The proposed treatment plan was discussed with the patient who was provided the opportunity to ask questions and make suggestions regarding alternative treatment. Patient verbalized understanding and expressed agreement with the plan.       Mehrdad Reddy M.D.  05/29/25    This note was created using voice recognition software (Dragon). The accuracy of the dictation is limited by the abilities of the software. I have reviewed the note prior to signing, however some errors in grammar and context are still possible. If you have any questions related to this note please do not hesitate to contact our office.            [1]   Current Outpatient Medications   Medication Sig Dispense Refill    lamoTRIgine (LAMICTAL) 100 MG Tab Take 2 Tablets by mouth every day. 180 Tablet 1    escitalopram (LEXAPRO) 10 MG Tab Take 1 Tablet by mouth every morning. 90 Tablet 1    tadalafil (CIALIS) 5 MG tablet Take 5 mg by mouth as needed.       No current facility-administered medications for this visit.   [2]   Past Medical History:  Diagnosis Date    Anxiety     Depression    [3] No past surgical history on file.

## 2025-06-17 ENCOUNTER — OFFICE VISIT (OUTPATIENT)
Dept: BEHAVIORAL HEALTH | Facility: CLINIC | Age: 25
End: 2025-06-17
Payer: COMMERCIAL

## 2025-06-17 DIAGNOSIS — F31.31 BIPOLAR AFFECTIVE DISORDER, CURRENTLY DEPRESSED, MILD (HCC): Primary | ICD-10-CM

## 2025-06-17 NOTE — PROGRESS NOTES
"[facility]   Therapy Progress Note    Name: Sergio Dave  MRN: [id number]  : 2000  Age: 24 y.o.  Date of assessment: 2025  PCP: Carlitos Bailey M.D.  Persons in attendance: Patient  Total session time: 50 min    Pt reports: Pt states he received a promotion and an increase in pay. Has worked for [employer] since January. Still likes the team he works with. He will have a month of training for the new position. It's mainly an operations role.     Has a move coming up due to increased rent. Is going to spend a little more but get some more space. July 10, he is prepared to move. Will be living alone with his cat Agon.    [place] States it was \"a lot of fun\". First time being in a wedding party.     Has been busy with work and moving but has been doing social things every weekend. Has been doing personal training 2 days per week on Wednesday and Friday. The new apartment will have a gym.    Has not had procrastination issues recently. Has been proactive.     Has taken trazodone as needed, but leaves him a little groggy. Sleep schedule needs a little work, likes to stay up late, TV and video games. Pt does feel tired sometimes during the day.    Has been looking to increase his social interaction. Has organized some interactions and has been invited to other activities. Wants to be more open to new people. Primarily approaching relationship stuff, \"putting himself out there on the market\". Where to meet women, financial issues, physical health.     Objective Observations:   Participation:Active verbal participation, Attentive, and Engaged   Grooming:Good, Casual, and Neat   Cognition:Alert and Fully Oriented   Eye Contact:Good   Mood:Euthymic   Affect:Full range and Congruent with content   Thought Process:Logical and Goal-directed   Speech:Rate within normal limits and Volume within normal limits    Current Risk:   Suicide: " low   Homicide: low   Self-Harm: low   Safety Plan Reviewed: not applicable    Evaluation: Patient presents as stable and goal-directed, reporting recent promotion and preparing for an upcoming move. He maintains regular social engagement, is active in personal training, and is proactively managing responsibilities. Sleep hygiene remains an area for improvement due to late-night habits, though he reports only mild daytime fatigue. No current procrastination concerns. Patient is exploring romantic relationships and increasing social openness. Overall functioning appears improved with positive momentum across occupational, social, and personal domains.    Pt needs a letter stating his cat Jared is an emotional support animal directed to property management.     Plan:   Goal #1: Improve physical health and support weight loss  and sleep goals  Objective: Patient will continue personal training 2x/week and incorporate additional physical activity or nutrition changes.  Interventions:  Support consistency in exercise routine and healthy eating habits  Explore barriers to sleep hygiene as it impacts energy and metabolism  Monitor motivation and progress toward fitness goals    Goal #2: Increase opportunities for romantic connection  Objective: Patient will engage in social or dating activities to expand opportunities for connection.  Interventions:  Discuss values, intentions, and readiness for a relationship  Identify environments or platforms aligned with his interests for meeting new people  Encourage openness, self-confidence, and balanced expectations in dating    Care Plan Updated: Yes    Does patient express agreement with the treatment plan? Yes     Diagnosis: F31.9 Bipolar I disorder     Didi Us, PASH[employer] - Student

## 2025-06-17 NOTE — PROGRESS NOTES
[facility]   Therapy Progress Note    Name: Sergio Dave  MRN: [id number]  : 2000  Age: 24 y.o.  Date of assessment: 2025  PCP: Carlitos Bailey M.D.  Persons in attendance: Patient  Total session time: {***}    Pt reports: ***    Objective Observations:   Participation:{[facility] PARTICIPATION MEASURES:[id number]}   Grooming:{[facility]}   Cognition:{[facility] ORIENTATION:[id number]}   Eye Contact:{[facility] EYE CONTACT:[id number]}   Mood:{[facility] MOOD:66464836}   Affect:{[facility] AFFECT:63090905}   Thought Process:{[facility] THOUGHT PROCESS:[id number]}   Speech:{[facility] SPEECH:[id number]}    Current Risk:   Suicide: {Desc; low/moderate/high:783855}   Homicide: {Desc; low/moderate/high:407845}   Self-Harm: {Desc; low/moderate/high:618187}   Safety Plan Reviewed: {Response; yes/no/na:93893}    Evaluation: ***    Plan: ***    Care Plan Updated: {Yes/No/WC:945407}    Does patient express agreement with the treatment plan? {Yes/No/WC:297188}     Diagnosis:     MARK Martinez

## 2025-07-01 ENCOUNTER — OFFICE VISIT (OUTPATIENT)
Dept: BEHAVIORAL HEALTH | Facility: CLINIC | Age: 25
End: 2025-07-01
Payer: COMMERCIAL

## 2025-07-01 DIAGNOSIS — F41.1 GAD (GENERALIZED ANXIETY DISORDER): ICD-10-CM

## 2025-07-01 DIAGNOSIS — F31.9 BIPOLAR 1 DISORDER (HCC): Primary | ICD-10-CM

## 2025-07-01 PROCEDURE — 99999 PR NO CHARGE: CPT | Performed by: MARRIAGE & FAMILY THERAPIST

## 2025-07-01 PROCEDURE — 99214 OFFICE O/P EST MOD 30 MIN: CPT | Performed by: PSYCHIATRY & NEUROLOGY

## 2025-07-01 NOTE — PROGRESS NOTES
PSYCHIATRY FOLLOW-UP NOTE      Name: Sergio Dave  MRN: [id number]  : 2000  Age: 24 y.o.  Date of assessment: 2025  PCP: Carlitos Bailey M.D.  Persons in attendance: Patient      REASON FOR VISIT/CHIEF COMPLAINT (as stated by Patient):  Sergio Dave is a 24 y.o., White male, attending follow-up appointment for mood and anxiety management.      HISTORY OF PRESENT ILLNESS:  Sergio Dave is a 24 y.o. old male with mood and anxiety disorder comes in today for follow up. Patient was last seen 1 month ago, and following treatment planning recommendations were done:  Continue Lamictal 200 mg daily for mood stabilization.  Increase Lexapro 20 mg daily for depression and anxiety.  Add Trazodone 59-89- mg HS PRN for sleep.  Continue psychotherapy for mood and anxiety.     History of Present Illness    He reports an improvement in his sleep pattern, now retiring between 11:00 PM and 12:00 AM, compared to previous episodes of only 2 to 3 hours of sleep. He has been prescribed trazodone but has not been utilizing it frequently due to morning grogginess, but is taking 50 mg dosage.  Agreed to a trial of one third to half tablet of trazodone and taking it earlier than 11 PM.  Discussed the preferred time of between 8 to 9 PM as he wakes up around 4 in the morning.  Psychoeducation provided on the timing to prevent the morning grogginess.  He reports inconsistent adherence to his Lexapro regimen (3-4 days/week usage), which he takes in the morning, and is contemplating switching to evening administration.   He has not taken any medications today.   He recently received a promotion at work, which has increased his stress levels. He is also in the process of moving locally.   Agreed with moving Lamictal and Lexapro to dinnertime to maintain consistency and taking trazodone 30-minute before bedtime.    Overall patient is not finding much difference but agreed with enhancing medication compliance first at least  for the next 3 weeks to assess if Lexapro is therapeutic.      CURRENT MEDICATIONS:  Current Medications[1]    MEDICAL HISTORY  Past Medical History[2]  Past Surgical History[3]      PAST PSYCHIATRIC MEDICATIONS  Lithium, Depakote, Lamictal  Haldol, Thorazine, Risperidone, Seroquel, Clozapine     Lexapro     Buspar, Klonopin     REVIEW OF SYSTEMS:        Constitutional negative   Eyes negative   Ears/Nose/Mouth/Throat negative   Cardiovascular negative   Respiratory negative   Gastrointestinal negative   Genitourinary negative   Muscular negative   Integumentary negative   Neurological negative   Endocrine negative   Hematologic/Lymphatic negative     PHYSICAL EXAMINAION:  Vital signs: There were no vitals taken for this visit.  Musculoskeletal: Normal gait.   Abnormal movements: none      MENTAL STATUS EXAMINATION      General:   - Grooming and hygiene: Casual,   - Apparent distress: none,   - Behavior: Tense  - Eye Contact:  Good,   - no psychomotor agitation or retardation    - Participation: Active verbal participation  Orientation: Alert and Fully Oriented to person, place and time  Mood: Depressed and Anxious  Affect: Flexible,  Thought Process: Logical and Goal-directed  Thought Content: Denies suicidal or homicidal ideations, intent or plan   Perception: Denies auditory or visual hallucinations. No delusions noted   Attention span and concentration: Intact   Speech:Rate within normal limits and Volume within normal limits  Language: Appropriate   Insight: Good  Judgment: Good  Recent and remote memory: No gross evidence of memory deficits        DEPRESSION SCREENIN/26/2024     8:00 AM 12/3/2024     1:00 PM   Depression Screen (PHQ-2/PHQ-9)   PHQ-2 Total Score 0 3   PHQ-9 Total Score  11       Interpretation of PHQ-9 Total Score   Score Severity   1-4 No Depression   5-9 Mild Depression   10-14 Moderate Depression   15-19 Moderately Severe Depression   20-27 Severe Depression    CURRENT RISK:        Suicidal: Low       Homicidal: Low       Self-Harm: Low       Relapse: Low       Crisis Safety Plan Reviewed Not Indicated       If evidence of imminent risk is present, intervention/plan:      MEDICAL RECORDS/LABS/DIAGNOSTIC TESTS REVIEWED:  No new lab since last visit     [place]  records -   Reviewed     (1) Bipolar disorder vs MDD; (2) JEANNA  Mood and anxiety changes noted. No ryan or hypomania.  Continue Lamictal 200 mg daily (move to dinner) for mood stabilization.  Continue Lexapro 20 mg daily (move to dinner) for depression and anxiety.  Continue Trazodone 18-94- mg HS PRN for sleep.  Continue psychotherapy for mood and anxiety.   Medication options, alternatives (including no medications) and medication risks/benefits/side effects were discussed in detail.  The patient was advised to call, message provider on [facility], or come in to the clinic if symptoms worsen or if any future questions/issues regarding their medications arise; the patient verbalized understanding and agreement.    The patient was educated to call 911, call the suicide hotline, or go to local ER if having thoughts of suicide or homicide; verbalized understanding.      Billing Coding based on:  52331 based on MDM    Return to clinic in 3-4 weeks or sooner if symptoms worsen.  Next Appointment: instruction provided on how to make the next appointment.     The proposed treatment plan was discussed with the patient who was provided the opportunity to ask questions and make suggestions regarding alternative treatment. Patient verbalized understanding and expressed agreement with the plan.       Mehrdad Reddy M.D.  07/01/25    This note was created using voice recognition software (Dragon). The accuracy of the dictation is limited by the abilities of the software. I have reviewed the note prior to signing, however some errors in grammar and context are still possible. If you have any questions related to this note please do not hesitate to  contact our office.            [1]   Current Outpatient Medications   Medication Sig Dispense Refill    lamoTRIgine (LAMICTAL) 100 MG Tab Take 2 Tablets by mouth every day. 180 Tablet 1    escitalopram (LEXAPRO) 20 MG tablet Take 1 Tablet by mouth every day. 90 Tablet 1    traZODone (DESYREL) 50 MG Tab Take 1 Tablet by mouth at bedtime as needed for Sleep (as needed for sleep). 90 Tablet 1    tadalafil (CIALIS) 5 MG tablet Take 5 mg by mouth as needed.       No current facility-administered medications for this visit.   [2]   Past Medical History:  Diagnosis Date    Anxiety     Depression    [3] No past surgical history on file.

## 2025-07-22 ENCOUNTER — OFFICE VISIT (OUTPATIENT)
Dept: BEHAVIORAL HEALTH | Facility: CLINIC | Age: 25
End: 2025-07-22
Payer: COMMERCIAL

## 2025-07-22 DIAGNOSIS — F31.9 BIPOLAR AFFECTIVE DISORDER, REMISSION STATUS UNSPECIFIED (HCC): Primary | ICD-10-CM

## 2025-07-23 NOTE — PROGRESS NOTES
"[facility]   Therapy Progress Note    Name: Sergio Dave  MRN: [id number]  : 2000  Age: 25 y.o.  Date of assessment: 2025  PCP: DRE Blue D.O.  Persons in attendance: Patient  Total session time: 50 min    Pt reports:     Pt states that his move is complete but he is not yet unpacked. He is in now living in the [facility] off [facility]. Reports that he worked 80 hours last week, is not yet out of training. States the long hours are a processes to \"weed out\" employees who are not a good fit. Is still happy to have taken the position. Endorses that once he's unpacked he will start going to the gym, aiming for 6 days a week. Has a friend's wedding to go to in September.     Went to singles night at [facility], states he felt \"very young\". He went with a group of 5 friends but none of them had success. States he had a couple of friends over at the pool at his [facility]. His friend Lisa ramirez told him he wasn't confident enough to be introduced to her friends, he agrees. When asked why he lacks confidence he states that his weight is holding him back. When asked it that is the only reason he is not entirely certain.     When asked for three positive attributes he currently possesses he responds:  \"I'm there for people\"  \"I'm a little funny/sense of humor\"   \"Plays musical instruments\"    Objective Observations:   Participation:Active verbal participation, Attentive, and Engaged   Grooming:Casual and Neat   Cognition:Alert and Fully Oriented   Eye Contact:Good   Mood:Euthymic   Affect:Flexible, Full range, and Congruent with content   Thought Process:Logical and Goal-directed   Speech:Rate within normal limits and Volume within normal limits    Current Risk:   Suicide: low   Homicide: low   Self-Harm: low   Safety Plan Reviewed: not applicable    Evaluation: Patient is adjusting to recent life changes, including a demanding new job and relocation, with associated stressors such as " long work hours and lack of routine. Despite challenges, he expresses satisfaction with his decision and maintains future-oriented goals (e.g., returning to the gym, social engagement). Socially, he reports low confidence, attributing it in part to body image concerns, though he is insightful and able to identify personal strengths. No acute psychiatric symptoms reported; continued monitoring for mood, self-esteem, and adjustment-related stress is recommended.    Plan:   Pt restates his interest in pursuing the care plan outlined on 6/17/25 with additional focus on strengthening self-confidence added 7/1/25.     Interventions from Today’s Session Aligned with Care Plan:  Identification of Personal Strengths - The patient was asked to name three personal attributes, to which he responded.  Facilitated Insight and Reflection - When asked about his lack of confidence, the patient linked it to his weight but also acknowledged uncertainty about whether that is the sole reason. This demonstrates engagement in self-exploration, supporting the goal of increased self-awareness.  Social Context Reflection - Discussion about recent social experiences (e.g., singles night, pool gathering, feedback from a friend’s fiancée) provided an opportunity to explore the patient’s social identity, confidence, and perceived barriers while encouraging reflective processing in alignment with the care plan.    ** Notepad was used to format the information prior to pasting it into the patient chart to preserve the intended layout and formatting.     Care Plan Updated: Yes    Does patient express agreement with the treatment plan? Yes     Diagnosis:  F31.9 Bipolar I disorder    Didi Us, PASHAP - Student

## 2025-08-01 ENCOUNTER — APPOINTMENT (OUTPATIENT)
Dept: BEHAVIORAL HEALTH | Facility: CLINIC | Age: 25
End: 2025-08-01
Payer: COMMERCIAL

## 2025-08-01 PROBLEM — F33.1 MODERATE EPISODE OF RECURRENT MAJOR DEPRESSIVE DISORDER (HCC): Status: ACTIVE | Noted: 2024-12-03

## 2025-08-25 DIAGNOSIS — F41.1 GAD (GENERALIZED ANXIETY DISORDER): ICD-10-CM

## 2025-08-25 DIAGNOSIS — F33.1 MODERATE EPISODE OF RECURRENT MAJOR DEPRESSIVE DISORDER (HCC): ICD-10-CM

## 2025-08-26 ENCOUNTER — OFFICE VISIT (OUTPATIENT)
Dept: BEHAVIORAL HEALTH | Facility: CLINIC | Age: 25
End: 2025-08-26
Payer: COMMERCIAL

## 2025-08-26 DIAGNOSIS — F33.1 MAJOR DEPRESSIVE DISORDER, RECURRENT EPISODE, MODERATE (HCC): Primary | ICD-10-CM

## 2025-08-26 PROCEDURE — 90834 PSYTX W PT 45 MINUTES: CPT | Performed by: MARRIAGE & FAMILY THERAPIST
